# Patient Record
Sex: FEMALE | Race: BLACK OR AFRICAN AMERICAN | Employment: FULL TIME | URBAN - METROPOLITAN AREA
[De-identification: names, ages, dates, MRNs, and addresses within clinical notes are randomized per-mention and may not be internally consistent; named-entity substitution may affect disease eponyms.]

---

## 2017-02-07 ENCOUNTER — OFFICE VISIT (OUTPATIENT)
Dept: URGENT CARE | Facility: URGENT CARE | Age: 22
End: 2017-02-07
Payer: COMMERCIAL

## 2017-02-07 VITALS
SYSTOLIC BLOOD PRESSURE: 123 MMHG | DIASTOLIC BLOOD PRESSURE: 79 MMHG | WEIGHT: 153 LBS | HEART RATE: 74 BPM | TEMPERATURE: 98.2 F | BODY MASS INDEX: 25.49 KG/M2 | OXYGEN SATURATION: 99 % | HEIGHT: 65 IN

## 2017-02-07 DIAGNOSIS — J11.1 INFLUENZA-LIKE ILLNESS: Primary | ICD-10-CM

## 2017-02-07 PROCEDURE — 99202 OFFICE O/P NEW SF 15 MIN: CPT | Performed by: FAMILY MEDICINE

## 2017-02-07 RX ORDER — DESOGESTREL AND ETHINYL ESTRADIOL 0.15-0.03
1 KIT ORAL DAILY
COMMUNITY
End: 2019-12-17

## 2017-02-07 NOTE — Clinical Note
[facility]   [address], [place]  [address]  [phone]      February 7, 2017      To Whom It May Concern,    Nasra Galindo was seen tonight at [facility].  She was not able to attend school from February 6, 2017 thru February 8, 2017 secondary to a medical illness.  If better, she may attend school staring February 9, 2017.  Thanks.     Sincerely,      Margret Washington MD

## 2017-02-08 NOTE — PROGRESS NOTES
SUBJECTIVE:   Nasra Galindo is a 21 year old female who present complaining of flu-like symptoms: fevers, chills, myalgias, congestion, scratchy sore throat and cough for 4 days. Denies dyspnea or wheezing.  Needs an excuse note for missing school.   Did not have a flu shot last year.     OBJECTIVE:  Appears moderately ill but not toxic; temperature as noted in vitals. Ears normal. Throat and pharynx mildly injected, no exudates.  Neck supple. No adenopathy in the neck. Sinuses non tender. The chest is clear.    ASSESSMENT:  Influenza-like illness    PLAN:  Note for school given.   Symptomatic therapy suggested: rest, increase fluids, use mist of vaporizer prn, OTC acetaminophen, ibuprofen, decongestant of choice, cough suppressant of choice and call prn if symptoms persist or worsen. Call or return to clinic prn if these symptoms worsen or fail to improve as anticipated.  Margret Washington MD

## 2019-10-30 ENCOUNTER — OFFICE VISIT (OUTPATIENT)
Dept: FAMILY MEDICINE | Facility: CLINIC | Age: 24
End: 2019-10-30
Payer: COMMERCIAL

## 2019-10-30 VITALS
HEART RATE: 98 BPM | WEIGHT: 206 LBS | TEMPERATURE: 97.7 F | SYSTOLIC BLOOD PRESSURE: 127 MMHG | HEIGHT: 67 IN | BODY MASS INDEX: 32.33 KG/M2 | DIASTOLIC BLOOD PRESSURE: 88 MMHG

## 2019-10-30 DIAGNOSIS — N62 LARGE BREASTS: ICD-10-CM

## 2019-10-30 DIAGNOSIS — G89.29 CHRONIC BILATERAL THORACIC BACK PAIN: ICD-10-CM

## 2019-10-30 DIAGNOSIS — M54.42 CHRONIC MIDLINE LOW BACK PAIN WITH LEFT-SIDED SCIATICA: ICD-10-CM

## 2019-10-30 DIAGNOSIS — G89.29 CHRONIC MIDLINE LOW BACK PAIN WITH LEFT-SIDED SCIATICA: ICD-10-CM

## 2019-10-30 DIAGNOSIS — M54.6 CHRONIC BILATERAL THORACIC BACK PAIN: ICD-10-CM

## 2019-10-30 DIAGNOSIS — Z11.3 SCREEN FOR STD (SEXUALLY TRANSMITTED DISEASE): ICD-10-CM

## 2019-10-30 DIAGNOSIS — F41.8 DEPRESSION WITH ANXIETY: ICD-10-CM

## 2019-10-30 DIAGNOSIS — Z12.4 SCREENING FOR MALIGNANT NEOPLASM OF CERVIX: ICD-10-CM

## 2019-10-30 DIAGNOSIS — G89.29 CHRONIC PAIN OF LEFT KNEE: ICD-10-CM

## 2019-10-30 DIAGNOSIS — Z00.00 ROUTINE GENERAL MEDICAL EXAMINATION AT A HEALTH CARE FACILITY: Primary | ICD-10-CM

## 2019-10-30 DIAGNOSIS — M25.562 CHRONIC PAIN OF LEFT KNEE: ICD-10-CM

## 2019-10-30 PROCEDURE — G0145 SCR C/V CYTO,THINLAYER,RESCR: HCPCS | Performed by: FAMILY MEDICINE

## 2019-10-30 PROCEDURE — 99213 OFFICE O/P EST LOW 20 MIN: CPT | Mod: 25 | Performed by: FAMILY MEDICINE

## 2019-10-30 PROCEDURE — 87591 N.GONORRHOEAE DNA AMP PROB: CPT | Performed by: FAMILY MEDICINE

## 2019-10-30 PROCEDURE — 87491 CHLMYD TRACH DNA AMP PROBE: CPT | Performed by: FAMILY MEDICINE

## 2019-10-30 PROCEDURE — 99395 PREV VISIT EST AGE 18-39: CPT | Performed by: FAMILY MEDICINE

## 2019-10-30 ASSESSMENT — ENCOUNTER SYMPTOMS
CONSTIPATION: 0
EYE PAIN: 0
DIZZINESS: 0
HEADACHES: 0
PARESTHESIAS: 0
NAUSEA: 0
PALPITATIONS: 0
SORE THROAT: 0
MYALGIAS: 0
NERVOUS/ANXIOUS: 1
ABDOMINAL PAIN: 0
HEMATOCHEZIA: 0
JOINT SWELLING: 0
DIARRHEA: 0
WEAKNESS: 0
SHORTNESS OF BREATH: 0
ARTHRALGIAS: 1
HEMATURIA: 0
FEVER: 0
BREAST MASS: 0
HEARTBURN: 0
COUGH: 0
CHILLS: 0
DYSURIA: 0
FREQUENCY: 0

## 2019-10-30 ASSESSMENT — MIFFLIN-ST. JEOR: SCORE: 1719.1
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
## 2019-10-30 NOTE — PATIENT INSTRUCTIONS
We discussed various contraception options today. I do think that if you are not sexually active, you don't necessarily need to take hormones. Let us know if you decide to get an IUD or get change your contraceptive and we can schedule a time for you to come in for this.     KNEE: Try to do exercises that don't put a lot of strain on your knees. Please follow-up with physical therapy, so they can show you some low impact exercises that can help your knee symptoms.These physical therapy sessions will also help strengthen you upper, lower, and mid back.     90% of weight gain is attributed to diet, I would like you to consider implementing a healthier diet as this might help better manage weight gain. Try to see if you can get my fitness pale, as it is known to help with weight management.     Back pain: see physical therapy and remember that you can consult a plastic surgeon for free. Consult your insurance and see if you can get it cover it.      I would like you to send us a copy of your last cholesterol for our documentation purposes.     I would recommend that you follow-up with a sleep study, they may better assess any sleep symptoms you may be dealing with. Please follow-up with them when you get the chance.     Follow-up in 3 months for knee and back pain    Patient Education         Preventive Health Recommendations  Female Ages 21 to 25     Yearly exam:     See your health care provider every year in order to  o Review health changes.   o Discuss preventive care.    o Review your medicines if your doctor has prescribed any.      You should be tested each year for STDs (sexually transmitted diseases).       Talk to your provider about how often you should have cholesterol testing.      Get a Pap test every three years. If you have an abnormal result, your doctor may have you test more often.      If you are at risk for diabetes, you should have a diabetes test (fasting glucose).     Shots:     Get a flu shot  each year.     Get a tetanus shot every 10 years.     Consider getting the shot (vaccine) that prevents cervical cancer (Gardasil).    Nutrition:     Eat at least 5 servings of fruits and vegetables each day.    Eat whole-grain bread, whole-wheat pasta and brown rice instead of white grains and rice.    Get adequate Calcium and Vitamin D.     Lifestyle    Exercise at least 150 minutes a week each week (30 minutes a day, 5 days a week). This will help you control your weight and prevent disease.    Limit alcohol to one drink per day.    No smoking.     Wear sunscreen to prevent skin cancer.    See your dentist every six months for an exam and cleaning.

## 2019-10-30 NOTE — LETTER
[address] [address] [phone]                                                                                                November 4, 2019    Ratna Mckee  [address]  [facility] [address]        Dear Ms. Mckee,    Your recent lab results were within normal limits. A copy of those results are included with this letter.        Sincerely,      Bennie Calabrese, DO/hl    Results for orders placed or performed in visit on 10/30/19   Pap imaged thin layer screen only - recommended age 21 - 24 years     Status: None   Result Value Ref Range    PAP NIL     Copath Report         Patient Name: RATNA MCKEE  MR#: [id number]  Specimen #: [id number]  Collected: 10/30/2019  Received: 10/31/2019  Reported: 11/4/2019 09:01  Ordering Phy(s): BENNIE CALABRESE    For improved result formatting, select 'View Enhanced Report Format' under   Linked Documents section.    SPECIMEN/STAIN PROCESS:  Pap imaged thin layer prep screening (Surepath, FocalPoint with guided   screening)       Pap-Cyto x 1    SOURCE: Cervical, endocervical  ----------------------------------------------------------------   Pap imaged thin layer prep screening (Surepath, FocalPoint with guided   screening)  SPECIMEN ADEQUACY:  Satisfactory for evaluation.  -Transformation zone component absent.    CYTOLOGIC INTERPRETATION:    Negative for intraepithelial lesion or malignancy    Electronically signed out by:  DARELL Andre (ASCP)    CLINICAL HISTORY:    Papanicolaou Test Limitations:  Cervical cytology is a screening test with   limited sensitivity; regular  screening is critical for can cer prevention; Pap tests are primarily   effective for the diagnosis/prevention of  squamous cell carcinoma, not adenocarcinomas or other cancers.    COLLECTION SITE:  Client:  [facility]  Location: [facility])    The technical component of this testing was  completed at the [facility], with the professional component performed   at the [facility], [address] [place] [address],   [place] [address] ([phone])       Chlamydia trachomatis PCR     Status: None   Result Value Ref Range    Specimen Description Cervix     Chlamydia Trachomatis PCR Negative NEG^Negative   Neisseria gonorrhoeae PCR     Status: None   Result Value Ref Range    Specimen Descrip Cervix     N Gonorrhea PCR Negative NEG^Negative

## 2019-10-31 LAB
C TRACH DNA SPEC QL NAA+PROBE: NEGATIVE
N GONORRHOEA DNA SPEC QL NAA+PROBE: NEGATIVE
SPECIMEN SOURCE: NORMAL
SPECIMEN SOURCE: NORMAL

## 2019-11-04 LAB
COPATH REPORT: NORMAL
PAP: NORMAL

## 2019-11-06 ENCOUNTER — THERAPY VISIT (OUTPATIENT)
Dept: PHYSICAL THERAPY | Facility: CLINIC | Age: 24
End: 2019-11-06
Payer: COMMERCIAL

## 2019-11-06 DIAGNOSIS — N62 LARGE BREASTS: ICD-10-CM

## 2019-11-06 DIAGNOSIS — G89.29 CHRONIC MIDLINE LOW BACK PAIN WITH LEFT-SIDED SCIATICA: ICD-10-CM

## 2019-11-06 DIAGNOSIS — G89.29 CHRONIC PAIN OF BOTH KNEES: ICD-10-CM

## 2019-11-06 DIAGNOSIS — M54.6 CHRONIC BILATERAL THORACIC BACK PAIN: ICD-10-CM

## 2019-11-06 DIAGNOSIS — G89.29 CHRONIC BILATERAL THORACIC BACK PAIN: ICD-10-CM

## 2019-11-06 DIAGNOSIS — G89.29 CHRONIC PAIN OF LEFT KNEE: ICD-10-CM

## 2019-11-06 DIAGNOSIS — M25.562 CHRONIC PAIN OF BOTH KNEES: ICD-10-CM

## 2019-11-06 DIAGNOSIS — M25.561 CHRONIC PAIN OF BOTH KNEES: ICD-10-CM

## 2019-11-06 DIAGNOSIS — M54.42 CHRONIC MIDLINE LOW BACK PAIN WITH LEFT-SIDED SCIATICA: ICD-10-CM

## 2019-11-06 DIAGNOSIS — M25.562 CHRONIC PAIN OF LEFT KNEE: ICD-10-CM

## 2019-11-06 PROCEDURE — 97161 PT EVAL LOW COMPLEX 20 MIN: CPT | Mod: GP | Performed by: PHYSICAL THERAPIST

## 2019-11-06 PROCEDURE — 97110 THERAPEUTIC EXERCISES: CPT | Mod: GP | Performed by: PHYSICAL THERAPIST

## 2019-11-06 PROCEDURE — 97112 NEUROMUSCULAR REEDUCATION: CPT | Mod: GP | Performed by: PHYSICAL THERAPIST

## 2019-11-06 ASSESSMENT — ACTIVITIES OF DAILY LIVING (ADL)
RISE FROM A CHAIR: ACTIVITY IS MINIMALLY DIFFICULT
SIT WITH YOUR KNEE BENT: ACTIVITY IS SOMEWHAT DIFFICULT
HOW_WOULD_YOU_RATE_THE_CURRENT_FUNCTION_OF_YOUR_KNEE_DURING_YOUR_USUAL_DAILY_ACTIVITIES_ON_A_SCALE_FROM_0_TO_100_WITH_100_BEING_YOUR_LEVEL_OF_KNEE_FUNCTION_PRIOR_TO_YOUR_INJURY_AND_0_BEING_THE_INABILITY_TO_PERFORM_ANY_OF_YOUR_USUAL_DAILY_ACTIVITIES?: 60
HOW_WOULD_YOU_RATE_THE_OVERALL_FUNCTION_OF_YOUR_KNEE_DURING_YOUR_USUAL_DAILY_ACTIVITIES?: ABNORMAL
GIVING WAY, BUCKLING OR SHIFTING OF KNEE: THE SYMPTOM AFFECTS MY ACTIVITY SLIGHTLY
AS_A_RESULT_OF_YOUR_KNEE_INJURY,_HOW_WOULD_YOU_RATE_YOUR_CURRENT_LEVEL_OF_DAILY_ACTIVITY?: ABNORMAL
WALK: ACTIVITY IS MINIMALLY DIFFICULT
RAW_SCORE: 46
SQUAT: ACTIVITY IS FAIRLY DIFFICULT
SWELLING: I DO NOT HAVE THE SYMPTOM
GO UP STAIRS: ACTIVITY IS MINIMALLY DIFFICULT
LIMPING: THE SYMPTOM AFFECTS MY ACTIVITY SLIGHTLY
STAND: ACTIVITY IS SOMEWHAT DIFFICULT
KNEE_ACTIVITY_OF_DAILY_LIVING_SCORE: 65.71
WEAKNESS: I HAVE THE SYMPTOM BUT IT DOES NOT AFFECT MY ACTIVITY
PAIN: I HAVE THE SYMPTOM BUT IT DOES NOT AFFECT MY ACTIVITY
STIFFNESS: THE SYMPTOM AFFECTS MY ACTIVITY MODERATELY
KNEE_ACTIVITY_OF_DAILY_LIVING_SUM: 46
GO DOWN STAIRS: ACTIVITY IS MINIMALLY DIFFICULT
KNEEL ON THE FRONT OF YOUR KNEE: ACTIVITY IS VERY DIFFICULT
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
## 2019-11-12 ENCOUNTER — THERAPY VISIT (OUTPATIENT)
Dept: PHYSICAL THERAPY | Facility: CLINIC | Age: 24
End: 2019-11-12
Payer: COMMERCIAL

## 2019-11-12 DIAGNOSIS — G89.29 CHRONIC PAIN OF BOTH KNEES: ICD-10-CM

## 2019-11-12 DIAGNOSIS — M25.562 CHRONIC PAIN OF BOTH KNEES: ICD-10-CM

## 2019-11-12 DIAGNOSIS — M25.561 CHRONIC PAIN OF BOTH KNEES: ICD-10-CM

## 2019-11-12 PROCEDURE — 97110 THERAPEUTIC EXERCISES: CPT | Mod: GP | Performed by: PHYSICAL THERAPIST

## 2019-11-12 PROCEDURE — 97112 NEUROMUSCULAR REEDUCATION: CPT | Mod: GP | Performed by: PHYSICAL THERAPIST

## 2019-11-12 PROCEDURE — 97530 THERAPEUTIC ACTIVITIES: CPT | Mod: GP | Performed by: PHYSICAL THERAPIST

## 2019-11-20 ENCOUNTER — THERAPY VISIT (OUTPATIENT)
Dept: PHYSICAL THERAPY | Facility: CLINIC | Age: 24
End: 2019-11-20
Payer: COMMERCIAL

## 2019-11-20 DIAGNOSIS — M25.561 CHRONIC PAIN OF BOTH KNEES: ICD-10-CM

## 2019-11-20 DIAGNOSIS — G89.29 CHRONIC PAIN OF BOTH KNEES: ICD-10-CM

## 2019-11-20 DIAGNOSIS — M25.562 CHRONIC PAIN OF BOTH KNEES: ICD-10-CM

## 2019-11-20 PROCEDURE — 97112 NEUROMUSCULAR REEDUCATION: CPT | Mod: GP | Performed by: PHYSICAL THERAPIST

## 2019-11-20 PROCEDURE — 97110 THERAPEUTIC EXERCISES: CPT | Mod: GP | Performed by: PHYSICAL THERAPIST

## 2019-11-27 ENCOUNTER — THERAPY VISIT (OUTPATIENT)
Dept: PHYSICAL THERAPY | Facility: CLINIC | Age: 24
End: 2019-11-27
Payer: COMMERCIAL

## 2019-11-27 DIAGNOSIS — M25.562 CHRONIC PAIN OF BOTH KNEES: ICD-10-CM

## 2019-11-27 DIAGNOSIS — G89.29 CHRONIC PAIN OF BOTH KNEES: ICD-10-CM

## 2019-11-27 DIAGNOSIS — M25.561 CHRONIC PAIN OF BOTH KNEES: ICD-10-CM

## 2019-11-27 PROCEDURE — 97530 THERAPEUTIC ACTIVITIES: CPT | Mod: GP | Performed by: PHYSICAL THERAPIST

## 2019-11-27 PROCEDURE — 97140 MANUAL THERAPY 1/> REGIONS: CPT | Mod: GP | Performed by: PHYSICAL THERAPIST

## 2019-11-27 PROCEDURE — 97110 THERAPEUTIC EXERCISES: CPT | Mod: GP | Performed by: PHYSICAL THERAPIST

## 2019-12-04 ENCOUNTER — THERAPY VISIT (OUTPATIENT)
Dept: PHYSICAL THERAPY | Facility: CLINIC | Age: 24
End: 2019-12-04
Payer: COMMERCIAL

## 2019-12-04 DIAGNOSIS — G89.29 CHRONIC PAIN OF BOTH KNEES: ICD-10-CM

## 2019-12-04 DIAGNOSIS — M25.561 CHRONIC PAIN OF BOTH KNEES: ICD-10-CM

## 2019-12-04 DIAGNOSIS — M25.562 CHRONIC PAIN OF BOTH KNEES: ICD-10-CM

## 2019-12-04 PROCEDURE — 97110 THERAPEUTIC EXERCISES: CPT | Mod: GP | Performed by: PHYSICAL THERAPIST

## 2019-12-09 ENCOUNTER — PRE VISIT (OUTPATIENT)
Dept: SLEEP MEDICINE | Facility: CLINIC | Age: 24
End: 2019-12-09

## 2019-12-09 NOTE — TELEPHONE ENCOUNTER
"  1.  Reason for the visit:  Discuss snoring and possible sleep apnea  2.  Referring provider and clinic name:  Dr. Ramesh  3.  Previous Sleep Doctor or Pulmonlogist (clinic name)?  None noted  4.  Records, Procedures, Imaging, and Labs (see below)  No records to obtain        All NOTES from previous office visits that pertain to why they are being seen in the Sleep Center    Previous Sleep Studies, Chest CT, Echos and reports that pertain to why they are seeing Sleep Center    All Sleep records that have been done in the last 2 years that pertain to why they are seeing Sleep Center            Are they being seen for continuation of care for Cpap/Bipap/Avap/Trilogy/Dental Device? none    If yes to above Who and Where was Device issued/currently getting supplies from? na    Are you currently on \"Supplemental Oxygen\" during the day or night?   na                                                                                                                                                      Please remind pt to bring Cpap machine and ask to arrive 15 minutes early to appointment due traffic and congestion                                                 5. Pt Sleep Center Packet received Message left asking pt to arrive 30 minutes early to appointment if no packet received.        Yes: \"please make sure that you bring this to your appointment completed, either the doctor will not see you until this completed or you may be asked to reschedule your appointment.\"     No: mail or email to the pt and explain, \"please make sure that you bring this to your appointment completed, either the doctor will not see you until this completed or you may be asked to reschedule your appointment.\"     ~If pt coming early to fill packet out, ask that they come 30 minutes prior to their appointment~     6. Has the pt's medication list been updated and preferred pharmacy added?     7. Has the allergy list been reviewed?    \"Thank you for " "choosing [facility] and we look forward to seeing you at your upcoming appointment\"     "

## 2019-12-17 ENCOUNTER — OFFICE VISIT (OUTPATIENT)
Dept: SLEEP MEDICINE | Facility: CLINIC | Age: 24
End: 2019-12-17
Payer: COMMERCIAL

## 2019-12-17 VITALS
BODY MASS INDEX: 32.96 KG/M2 | OXYGEN SATURATION: 97 % | HEIGHT: 67 IN | HEART RATE: 104 BPM | WEIGHT: 210 LBS | RESPIRATION RATE: 18 BRPM | DIASTOLIC BLOOD PRESSURE: 82 MMHG | SYSTOLIC BLOOD PRESSURE: 127 MMHG

## 2019-12-17 DIAGNOSIS — G47.10 EXCESSIVE SLEEPINESS: ICD-10-CM

## 2019-12-17 DIAGNOSIS — F99 MENTAL HEALTH DISORDER: Primary | ICD-10-CM

## 2019-12-17 PROCEDURE — 99205 OFFICE O/P NEW HI 60 MIN: CPT | Performed by: INTERNAL MEDICINE

## 2019-12-17 RX ORDER — HYDROCORTISONE VALERATE CREAM 2 MG/G
CREAM TOPICAL
Refills: 1 | COMMUNITY
Start: 2019-07-02

## 2019-12-17 ASSESSMENT — MIFFLIN-ST. JEOR: SCORE: 1732.24

## 2019-12-17 NOTE — PATIENT INSTRUCTIONS
Your BMI is Body mass index is 33.39 kg/m .  Weight management is a personal decision.  If you are interested in exploring weight loss strategies, the following discussion covers the approaches that may be successful. Body mass index (BMI) is one way to tell whether you are at a healthy weight, overweight, or obese. It measures your weight in relation to your height.  A BMI of 18.5 to 24.9 is in the healthy range. A person with a BMI of 25 to 29.9 is considered overweight, and someone with a BMI of 30 or greater is considered obese. More than two-thirds of American adults are considered overweight or obese.  Being overweight or obese increases the risk for further weight gain. Excess weight may lead to heart disease and diabetes.  Creating and following plans for healthy eating and physical activity may help you improve your health.  Weight control is part of healthy lifestyle and includes exercise, emotional health, and healthy eating habits. Careful eating habits lifelong are the mainstay of weight control. Though there are significant health benefits from weight loss, long-term weight loss with diet alone may be very difficult to achieve- studies show long-term success with dietary management in less than 10% of people. Attaining a healthy weight may be especially difficult to achieve in those with severe obesity. In some cases, medications, devices and surgical management might be considered.  What can you do?  If you are overweight or obese and are interested in methods for weight loss, you should discuss this with your provider.     Consider reducing daily calorie intake by 500 calories.     Keep a food journal.     Avoiding skipping meals, consider cutting portions instead.    Diet combined with exercise helps maintain muscle while optimizing fat loss. Strength training is particularly important for building and maintaining muscle mass. Exercise helps reduce stress, increase energy, and improves fitness.  Reason For Visit  Pediatric Neurology consultation for. Extremity stiffness, history of intrauterine drug exposure/LISA, motor delay, speech delay. ARIEL is accompanied by his grandparent who are legal guardians.      Referred By  Referred By:   Dr BABAR Bacon  [facility]  [address]  [place]    Referring provider is the PCP        Quality    no exposure to secondhand smoke from cigarettes.      History of Present Illness    Ariel is a pleasant 2.5 -year-old boy who was seen in clinic on 10/5/2018.    He does have a fairly complicated medical history. His paternal grandparents are his legal guardians due to a history of substance abuse in both the mother and the father.    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 After delivery he was admitted to the  intensive care unit for LISA, morphine, phenobarbital and clonidine were initiated, withdrawal symptoms improved and this was discontinued prior to discharge.    At 6 months of age: paternal grandparents are his legal guardians due to a history of substance abuse in both the mother and the father. Unable to sit without support, no head control, hypotonic and underweight. Evaluated by early intervention, PT, OT and DT initiated     He was evaluated by an outside neurologist who suggested continuation of therapy and monitoring for cognitive delay.    I did review with the family potential etiologies as well as diagnostic and therapeutic options going forward. Between 8 months of age -2018: Grandparents have noted approximately 70% improvement, decreased stiffness, improvement in truncal hypotonia, independent head control at 8 months, began to crawl, pull to stand, cruising furniture, take steps with assistance, follows one-step commands, walks with a walker,  Increasing exercise without diet control, however, may not burn enough calories to loose weight.       Start walking three days a week 10-20 minutes at a time    Work towards walking thirty minutes five days a week     Eventually, increase the speed of your walking for 1-2 minutes at time    In addition, we recommend that you review healthy lifestyles and methods for weight loss available through the National Institutes of Health patient information sites:  http://win.niddk.nih.gov/publications/index.htm    And look into health and wellness programs that may be available through your health insurance provider, employer, local community center, or krz[employer]ztof club.    Weight management plan: Patient was referred to their PCP to discuss a diet and exercise plan.       she breaking objects, no pincer grasp, able to bring a spoon to his mouth to feed but somewhat unsteady/messy, takes his clothes off, toe walking, intermittent stiffening of the extremities along with arching when he is upset or frustrated or excited (Does not occur when he is calm and quiet). Family is working on speech. As per HPI/chart    No seizures, repetitive rhythmical movements of the extremities during sleep, occasional tossing and turning but does go back to bed, social smile, interacts and engages in play of other children are present, no sensory issues, no temper tantrums, no regression, no neurocutaneous stigmata, no tremors. As per HPI/chart        Review of Systems  as per HPi/chart        Current Meds   · No Reported Medications Recorded    Past Medical History    Past medical history was reviewed.      1. Intrauterine drug exposure   2. Hypotonia  3. Stiffness of the extremities, toe walking  4. History of  abstinence previously placed on morphine, phenobarbital and clonidine.  5. Motor delay/speech delay as per HPI/chart     Birth History:. as above.      Surgical History  as above.      Family History  The Patient/Family denies a family history of PGF- delayed walking  No seizures, no early childhood deaths.  Family history with regard to maternal side is somewhat limited given that legal guardians are the paternal grandparents. And mother is now present at the visit.   family medical history was reviewed.      Social History  He lives with his grandparent(s). PGP's3 legal gaurdians.   Childcare is provided Family friend and grandparents.      Vitals  Vital Signs    Recorded: 2018 08:11AM   Height 79.5 cm   Weight 11.5 kg   BMI Calculated 18.2   BSA Calculated 0.48   BMI Percentile 90   2-20 Stature Percentile 1 %   2-20 Weight Percentile 8 %   Head Circumference 45 cm     Results/Data  None.      Developmental Milestones    As above/chart.            Physical Exam  Constitutional:  well developed, well nourished and in no acute distress . Smiling, interactive, follows one-step commands, irritable and arching and stiffening is noted which completely resolves once patient is calm and quiet.   Head and Face: atraumatic. anterior fontanelle was normal and posterior fontanelle was normal. no dysmorphic features.   Eyes: no discharge, normal conjunctiva, no eyelid swelling and no ptosis. the sclerae were normal, pupils equal and reactive to light, conjugate gaze and extraocular movements were intact.   ENT: normal appearing outer ear, normal appearing nose. nasal mucosa moist and pink no nasal discharge oral mucosa pink and moist.   Neck: normal appearing neck.   Chest: normal chest appearance.   Cardiovascular: normal rate and regular rhythm.   Musculoskeletal: Toe walking, able to plan to the heel, tameka and recovers an object off the floor, walks with assistance,. no clubbing or cyanosis of the fingernails. no joint swelling seen and no joint tenderness was elicited. normal ROM of all extremities.   Abdomen: soft, nontender, nondistended and no abdominal mass. no umbilical hernia was discovered.   Neurologic: cranial nerves grossly intact and no facial asymmetry. age appropriate DTRs . somewhat brisk but no clonus, Babinski's downward. no coordination deficits observed . Reaches for objects without ataxia. Hypotonic in the trunk, somewhat increased tone in the distal extremities, toe walking noted, no scissoring when held in ventral suspension. sensory exam grossly normal.   Skin: normal skin color and pigmentation, no bruises and no rash. no skin lesions. normal skin turgor.      Assessment   1. Stiffness of extremity (M25.60)   2. 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 Truncal hypotonia (P94.2)    Discussion/Summary  Currently, the condition is moderate in severity.   Patient Discussion:   I have explained to the family that it is impossible for me to predict whether the symptoms would  continue or worsen, and, if so, how significant or severe these would be.    I have counseled the family extensively regarding the nature of the patient's difficulties, course, prognosis, plan, recommendations and outcome. The family will keep me informed of the patient's progress.      Plan  Follow-ups   Follow-up visit in 3 months Follow Up  Follow-up  Status: Active  Requested for:  2019    Is a pleasant 2.5-year-old child born at approximately 37 weeks gestation with a history of in utero drug exposure,  abstinence syndrome status post methadone, phenobarbital and clonidine. Family states they have noticed 70% improvement with therapy but continue to note some truncal hypotonia, stiffness in the extremities, motor delay, speech delay.     I did review with the family potential etiologies as well as diagnostic and therapeutic options going forward. They feel comfortable with below recommendations per    Recommendation:  1. Continue with therapy program.  2. Given 70% improvement - they feel comfortable deferring imaging and eeg.  3. seizure precautions and safety discussed  4. If clinical changes are noted they will contact the clinic.  5. Additional diagnostic and therapeutic options pending the above. 3.  Monitor for seizures.  If noted     Return to clinic in 3 month(s).      Time  Consult Counseling:   Total face to face time spent with patient 70 minutes. Greater than 50% of the total time was spent counseling and/or coordinating care.      Signatures   Electronically signed by : BENJA CHAO M.D.; Oct  5 2018 11:28AM CST

## 2019-12-18 NOTE — PROGRESS NOTES
DICTATED THE SLEEP CLINIC VISIT NOTE   Izabella Quevedo MD   of Medicine,  Division of Pulmonary/Sleep Medicine  [facility].
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
## 2020-01-02 ENCOUNTER — THERAPY VISIT (OUTPATIENT)
Dept: PHYSICAL THERAPY | Facility: CLINIC | Age: 25
End: 2020-01-02
Payer: COMMERCIAL

## 2020-01-02 DIAGNOSIS — M25.562 CHRONIC PAIN OF BOTH KNEES: ICD-10-CM

## 2020-01-02 DIAGNOSIS — M25.561 CHRONIC PAIN OF BOTH KNEES: ICD-10-CM

## 2020-01-02 DIAGNOSIS — G89.29 CHRONIC PAIN OF BOTH KNEES: ICD-10-CM

## 2020-01-02 PROCEDURE — 97112 NEUROMUSCULAR REEDUCATION: CPT | Mod: GP | Performed by: PHYSICAL THERAPIST

## 2020-01-02 PROCEDURE — 97530 THERAPEUTIC ACTIVITIES: CPT | Mod: GP | Performed by: PHYSICAL THERAPIST

## 2020-01-09 ENCOUNTER — OFFICE VISIT (OUTPATIENT)
Dept: ORTHOPEDICS | Facility: CLINIC | Age: 25
End: 2020-01-09
Payer: COMMERCIAL

## 2020-01-09 VITALS — HEIGHT: 66 IN | BODY MASS INDEX: 33.75 KG/M2 | WEIGHT: 210 LBS

## 2020-01-09 DIAGNOSIS — M54.50 ACUTE LEFT-SIDED LOW BACK PAIN WITHOUT SCIATICA: Primary | ICD-10-CM

## 2020-01-09 RX ORDER — TIZANIDINE 2 MG/1
2-4 TABLET ORAL 3 TIMES DAILY
Qty: 30 TABLET | Refills: 0 | Status: SHIPPED | OUTPATIENT
Start: 2020-01-09 | End: 2020-11-02

## 2020-01-09 ASSESSMENT — MIFFLIN-ST. JEOR: SCORE: 1724.3

## 2020-01-09 NOTE — PROGRESS NOTES
"[facility]  Orthopedics  Dany Ortiz MD  2020     Name: Nasra Galindo  MRN: [id number]  Age: 24 year old  : 1995  Referring provider: Referred Self     Chief Complaint: Pain of the Lower Back     History of Present Illness:   Nasra Galindo is a 24 year old female, with a history of hip alignment issues, who presents today for evaluation of left lower back pain sustained yesterday when she was at yoga without any inciting injury. She typically endorses a low grade pain in her low back, but she recently endorsed sharp pain in her low back that caused her left leg to give out.  Pain radiates to the left gluteal area.  No tingling or numbness.  She is currently in physical therapy for low back and her knees. Pain is exacerbated with weightbearing, sitting, bending, and movement. Pain is alleviated with the butterfly pose in yoga.     Review of Systems:   A 10-point review of systems was obtained and is negative except for as noted in the HPI.     Medications:      hydrocortisone (WESTCORT) 0.2 % external cream, APPLY TO AFFECTED AREA TWICE DAILY FOR NO MORE THAN TWO WEEKS., Disp: , Rfl: 1    Allergies:  Patient has no known allergies.     Past Medical History:  Anxiety  Depressive disorder    Past Surgical History:  Biopsy    Social History:  She denies tobacco use and admits to alcohol use.     Family History:  Positive: Diabetes (father), Mental illness (father), Obesity (father, maternal grandmother), Hypertension (mother), Hyperlipidemia (mother), Depression (mother), Anxiety (mother), Substance Abuse (mother)    Physical Examination:  Height 1.676 m (5' 6\"), weight 95.3 kg (210 lb), not currently breastfeeding.  General: alert, pleasant, no distress. sitting comfortably in chair  Back/Spine: no tenderness to palpation of spinous processes, or paraspinous musculature of lumbar spine.  Pain worse with side bending and forward flexion.  Straight leg raise produces pain back on the left. No hamstring " tightness noted. no pain in back with KIARRA testing bilaterally  Neuro: SILT on bilateral lower extremities, can stand on toes and heel walk without difficulty, patellar and achilles reflexes 2+ and symmetric, babinski downgoing bilaterally     Assessment:   24 year old female with acute left-sided low back pain concerning for mechanical etiology and spasm.    Plan:   - NSAIDS for symptomatic relief.   - Consider taking Tizanidine at [facility] for muscle spasm relief.   - Continue physical therapy.   - Follow-up with me if pain is persistent or worsens.    Dany Ortiz MD      Scribe Disclosure:  Russ MURDOCK, am serving as a scribe to document services personally performed by Dany Ortiz MD at this visit, based upon the provider's statements to me. All documentation has been reviewed by the aforementioned provider prior to being entered into the official medical record.

## 2020-01-09 NOTE — PROGRESS NOTES
SPORTS & ORTHOPEDIC WALK-IN VISIT 1/9/2020    Primary Care Physician:      She is currently in PT for low back and knees. She has issues with hip alignment and has been working on glute strengthening and core exercises. Typically it is a low grade pain, she went to yoga and early on her left leg gave out on her. She started to have intense low back pain. The majority of her pain is on her low back left glute.     Reason for visit:     What part of your body is injured / painful?  left low back    What caused the injury /pain? Unsure    How long ago did your injury occur or pain begin? yesterday    What are your most bothersome symptoms? Pain and leg giving way    How would you characterize your symptom?  sharp    What makes your symptoms better? Other: Butterfly pose in yoga    What makes your symptoms worse? Standing, Walking, Sitting, Movement and Bending    Have you been previously seen for this problem? Yes, PT    Medical History:    Any recent changes to your medical history? No    Any new medication prescribed since last visit? No    Have you had surgery on this body part before? No    Social History:    Occupation: Desk work    Handedness: Right    Exercise: PT and yoga    Review of Systems:    Do you have fever, chills, weight loss? No    Do you have any vision problems? No    Do you have any chest pain or edema? No    Do you have any shortness of breath or wheezing?  No    Do you have stomach problems? No    Do you have any numbness or focal weakness? Yes, weakness knees    Do you have diabetes? No    Do you have problems with bleeding or clotting? No    Do you have an rashes or other skin lesions? No

## 2020-01-13 ENCOUNTER — OFFICE VISIT (OUTPATIENT)
Dept: SLEEP MEDICINE | Facility: CLINIC | Age: 25
End: 2020-01-13
Payer: COMMERCIAL

## 2020-01-13 DIAGNOSIS — G47.10 HYPERSOMNIA, UNSPECIFIED: Primary | ICD-10-CM

## 2020-01-17 ENCOUNTER — THERAPY VISIT (OUTPATIENT)
Dept: PHYSICAL THERAPY | Facility: CLINIC | Age: 25
End: 2020-01-17
Payer: COMMERCIAL

## 2020-01-17 DIAGNOSIS — M25.562 CHRONIC PAIN OF BOTH KNEES: ICD-10-CM

## 2020-01-17 DIAGNOSIS — G89.29 CHRONIC PAIN OF BOTH KNEES: ICD-10-CM

## 2020-01-17 DIAGNOSIS — M25.561 CHRONIC PAIN OF BOTH KNEES: ICD-10-CM

## 2020-01-17 PROCEDURE — 97110 THERAPEUTIC EXERCISES: CPT | Mod: GP | Performed by: PHYSICAL THERAPIST

## 2020-01-17 PROCEDURE — 97140 MANUAL THERAPY 1/> REGIONS: CPT | Mod: GP | Performed by: PHYSICAL THERAPIST

## 2020-01-26 ENCOUNTER — DOCUMENTATION ONLY (OUTPATIENT)
Dept: SLEEP MEDICINE | Facility: CLINIC | Age: 25
End: 2020-01-26

## 2020-01-26 ENCOUNTER — THERAPY VISIT (OUTPATIENT)
Dept: SLEEP MEDICINE | Facility: CLINIC | Age: 25
End: 2020-01-26
Payer: COMMERCIAL

## 2020-01-26 DIAGNOSIS — G47.10 EXCESSIVE SLEEPINESS: ICD-10-CM

## 2020-01-26 DIAGNOSIS — G47.19 EXCESSIVE DAYTIME SLEEPINESS: ICD-10-CM

## 2020-01-26 PROCEDURE — 95810 POLYSOM 6/> YRS 4/> PARAM: CPT | Mod: GC | Performed by: INTERNAL MEDICINE

## 2020-01-27 ENCOUNTER — THERAPY VISIT (OUTPATIENT)
Dept: SLEEP MEDICINE | Facility: CLINIC | Age: 25
End: 2020-01-27
Payer: COMMERCIAL

## 2020-01-27 ENCOUNTER — DOCUMENTATION ONLY (OUTPATIENT)
Dept: SLEEP MEDICINE | Facility: CLINIC | Age: 25
End: 2020-01-27
Payer: COMMERCIAL

## 2020-01-27 DIAGNOSIS — G47.19 EXCESSIVE DAYTIME SLEEPINESS: Primary | ICD-10-CM

## 2020-01-27 LAB
AMPHETAMINES UR QL SCN: NEGATIVE
BARBITURATES UR QL: NEGATIVE
BENZODIAZ UR QL: NEGATIVE
CANNABINOIDS UR QL SCN: NEGATIVE
COCAINE UR QL: NEGATIVE
ETHANOL UR QL SCN: NEGATIVE
OPIATES UR QL SCN: NEGATIVE
PCP UR QL SCN: NEGATIVE

## 2020-01-27 PROCEDURE — 80307 DRUG TEST PRSMV CHEM ANLYZR: CPT | Performed by: INTERNAL MEDICINE

## 2020-01-27 PROCEDURE — 80320 DRUG SCREEN QUANTALCOHOLS: CPT | Performed by: INTERNAL MEDICINE

## 2020-01-27 PROCEDURE — 95805 MULTIPLE SLEEP LATENCY TEST: CPT | Performed by: INTERNAL MEDICINE

## 2020-01-27 NOTE — PATIENT INSTRUCTIONS
[facility]    1. Your sleep study will be reviewed by a sleep physician within the next few days.     2. Please follow up in the sleep clinic as scheduled, or, make an appointment with your sleep provider to be seen within two weeks to discuss the results of the sleep study.    3. If you have any questions or problems with your treatment plan, please contact your sleep clinic provider at [phone] to further manage your condition.    4. Please review your attached medication list, and, at your follow-up appointment advise your sleep clinic provider about any changes.    5. Go to [email or web address] for more information about your sleep problems.    Deon Dixon, RPSGT  January 27, 2020

## 2020-01-28 NOTE — PROGRESS NOTES
PHYSICIAN INTERPRETATION  Home Sleep Schedule-Actigraphy      Patient:  Nasra Galindo  MRN:  [id number]   Ext. ID: [id number]   YOB: 1995  Study Date: 1/27/2020    [facility] Phys[facility]  [facility]    Dates of recording: from 1/14 to 1/26/2020  Quality: good      Sleep diary: correlates well (No sleep log for 2 days)     Sleep onset typically/or range at 11pm- 12:30 mostly, with few exceptions of even more delayed sleep onset as late as 2 am.      Sleep offset typically/or range at  6:45-9:15 am         Total sleep time estimated is 7.14 hrs with shortest sleep period time of 6.0 hrs and longest sleep period time of 10 hrs.    Sleep periods are consolidated.     Light exposure periods are appropriately timed to sleep schedule    Napping is not noted.     Interpretation: Sleep wake pattern is consistent with       Sleep phase delay with few nights of insufficient sleep       Interpreted by:  Sreekanth Stern MD, MD  Sleep Medicine Fellow       327.31 Circadian rhythm sleep disorder, delayed sleep phase type  327.33 Circadian rhythm sleep disorder, irregular sleep-wake type

## 2020-01-28 NOTE — PROCEDURES
PHYSICIAN INTERPRETATION  Home Sleep Schedule-Actigraphy      Patient:  Nasra Galindo  MRN:  [id number]   Ext. ID: [id number]   YOB: 1995  Study Date: 1/27/2020    [facility]    Dates of recording: from 1/14 to 1/26/2020  Quality: good      Sleep diary: correlates well (No sleep log for 2 days)     Sleep onset typically/or range at 11pm- 12:30 mostly, with few exceptions of even more delayed sleep onset as late as 2 am.      Sleep offset typically/or range at  6:45-9:15 am         Total sleep time estimated is 7.14 hrs with shortest sleep period time of 6.0 hrs and longest sleep period time of 10 hrs.    Sleep periods are consolidated.     Light exposure periods are appropriately timed to sleep schedule    Napping is not noted.     Interpretation: Sleep wake pattern is consistent with       Sleep phase delay with few nights of insufficient sleep       Interpreted by:  Sreekanth Stern MD, MD  Sleep Medicine Fellow       327.31 Circadian rhythm sleep disorder, delayed sleep phase type  327.33 Circadian rhythm sleep disorder, irregular sleep-wake type

## 2020-01-31 ENCOUNTER — THERAPY VISIT (OUTPATIENT)
Dept: PHYSICAL THERAPY | Facility: CLINIC | Age: 25
End: 2020-01-31
Payer: COMMERCIAL

## 2020-01-31 DIAGNOSIS — M25.561 CHRONIC PAIN OF BOTH KNEES: Primary | ICD-10-CM

## 2020-01-31 DIAGNOSIS — M25.562 CHRONIC PAIN OF BOTH KNEES: Primary | ICD-10-CM

## 2020-01-31 DIAGNOSIS — G89.29 CHRONIC PAIN OF BOTH KNEES: Primary | ICD-10-CM

## 2020-01-31 PROCEDURE — 97110 THERAPEUTIC EXERCISES: CPT | Mod: GP | Performed by: PHYSICAL THERAPIST

## 2020-01-31 PROCEDURE — 97530 THERAPEUTIC ACTIVITIES: CPT | Mod: GP | Performed by: PHYSICAL THERAPIST

## 2020-02-01 NOTE — PROGRESS NOTES
PROGRESS  REPORT    Progress reporting period is from 11/6/19 to 1/31/20.       SUBJECTIVE   Subjective: Pt doing a ton of yoga the past few weeks. She notes that her self MET has been really helpful. The patient is very pleased with her progress and reports that she feels much stronger. She has not switched back to her normal exercises of strength training yet, and she would ultimately like to return to running. She has stopped taping her knees and they have been feeling well. She has lost 7lbs since xmas.    Changes in function:  Yes (See Goal flowsheet attached for changes in current functional level)  Adverse reaction to treatment or activity: None    OBJECTIVE  Changes noted in objective findings:  Yes,   Objective:   observation: ASIS level patient supine, positive trendelenburg bilaterally, good transverse abdominis coordination without cueing.     ASSESSMENT/PLAN  Updated problem list and treatment plan: Diagnosis 1:  lumbo pelvic instability  Pain -  hot/cold therapy, US, electric stimulation, mechanical traction, manual therapy, STS, splint/taping/bracing/orthotics, self management, education, directional preference exercise and home program  Decreased ROM/flexibility - manual therapy, therapeutic exercise, therapeutic activity and home program  Decreased strength - therapeutic exercise, therapeutic activities and home program  Impaired muscle performance - neuro re-education and home program  STG/LTGs have been met or progress has been made towards goals:  Yes (See Goal flow sheet completed today.)  Assessment of Progress: The patient's condition is improving.  Self Management Plans:  Patient has been instructed in a home treatment program.  Patient  has been instructed in self management of symptoms.  I have re-evaluated this patient and find that the nature, scope, duration and intensity of the therapy is appropriate for the medical condition of the patient.  Nasra continues to require the following  intervention to meet STG and LTG's:  PT    Recommendations:  This patient would benefit from continued therapy.     Frequency:  2 X a month, once daily  Duration:  for 2 months        Please refer to the daily flowsheet for treatment today, total treatment time and time spent performing 1:1 timed codes.

## 2020-02-10 ENCOUNTER — OFFICE VISIT (OUTPATIENT)
Dept: SLEEP MEDICINE | Facility: CLINIC | Age: 25
End: 2020-02-10
Payer: COMMERCIAL

## 2020-02-10 VITALS
BODY MASS INDEX: 32.78 KG/M2 | HEIGHT: 66 IN | OXYGEN SATURATION: 96 % | WEIGHT: 204 LBS | RESPIRATION RATE: 16 BRPM | SYSTOLIC BLOOD PRESSURE: 113 MMHG | DIASTOLIC BLOOD PRESSURE: 77 MMHG | HEART RATE: 63 BPM

## 2020-02-10 DIAGNOSIS — F51.5 NIGHTMARE DISORDER: Primary | ICD-10-CM

## 2020-02-10 DIAGNOSIS — G47.20 CIRCADIAN RHYTHM SLEEP DISTURBANCE: ICD-10-CM

## 2020-02-10 LAB — SLPCOMP: NORMAL

## 2020-02-10 PROCEDURE — 99214 OFFICE O/P EST MOD 30 MIN: CPT | Performed by: INTERNAL MEDICINE

## 2020-02-10 ASSESSMENT — MIFFLIN-ST. JEOR: SCORE: 1697.09

## 2020-02-10 NOTE — PATIENT INSTRUCTIONS
Your BMI is Body mass index is 32.93 kg/m .  Weight management is a personal decision.  If you are interested in exploring weight loss strategies, the following discussion covers the approaches that may be successful. Body mass index (BMI) is one way to tell whether you are at a healthy weight, overweight, or obese. It measures your weight in relation to your height.  A BMI of 18.5 to 24.9 is in the healthy range. A person with a BMI of 25 to 29.9 is considered overweight, and someone with a BMI of 30 or greater is considered obese. More than two-thirds of American adults are considered overweight or obese.  Being overweight or obese increases the risk for further weight gain. Excess weight may lead to heart disease and diabetes.  Creating and following plans for healthy eating and physical activity may help you improve your health.  Weight control is part of healthy lifestyle and includes exercise, emotional health, and healthy eating habits. Careful eating habits lifelong are the mainstay of weight control. Though there are significant health benefits from weight loss, long-term weight loss with diet alone may be very difficult to achieve- studies show long-term success with dietary management in less than 10% of people. Attaining a healthy weight may be especially difficult to achieve in those with severe obesity. In some cases, medications, devices and surgical management might be considered.  What can you do?  If you are overweight or obese and are interested in methods for weight loss, you should discuss this with your provider.     Consider reducing daily calorie intake by 500 calories.     Keep a food journal.     Avoiding skipping meals, consider cutting portions instead.    Diet combined with exercise helps maintain muscle while optimizing fat loss. Strength training is particularly important for building and maintaining muscle mass. Exercise helps reduce stress, increase energy, and improves fitness.  Increasing exercise without diet control, however, may not burn enough calories to loose weight.       Start walking three days a week 10-20 minutes at a time    Work towards walking thirty minutes five days a week     Eventually, increase the speed of your walking for 1-2 minutes at time    In addition, we recommend that you review healthy lifestyles and methods for weight loss available through the National Institutes of Health patient information sites:  http://win.niddk.nih.gov/publications/index.htm    And look into health and wellness programs that may be available through your health insurance provider, employer, local community center, or [employer].    Weight management plan: Patient was referred to their PCP to discuss a diet and exercise plan.

## 2020-02-10 NOTE — PROGRESS NOTES
[facility]  Outpatient Sleep Medicine Consultation  February 10, 2020      Name: Nasra Galindo MRN# [id number]   Age: 24 year old YOB: 1995     Date of Consultation: February 10, 2020  Consultation is requested by: No referring provider defined for this encounter.  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 twice weekly and symptoms of insecurity in the house since the age of 5 while sleeping alone at home      Summary Recommendations:      Extend sleep time to 11:30 PM bedtime to awakening time at 8 AM using her flexibility of work schedule    Image rehearsal therapy at least 15 to 20 minutes 1 hour before bedtime using 1 or 2 scripted positive dreamlike images    Keep sleep diary over the next week of adherence to the above and to frequency of nightmares     Summary Counseling:  -We discussed structured image rehearsal therapy using 's recommendations and had her recite the plan back to make sure she understood it.  -We also discussed extending sleep time and regularization of sleep schedule to prevent partial arousals contributing to her sleep talking and occasional limb movements at night that may reflect chronic insufficient sleep.  -We discussed the importance of sleep for optimization of cognition and emotional regulation and the mechanisms of recurrent nightmare disorder.           History of Present Illness:     Nasra Galindo is a 24 year old female with an [place] Sleepiness Scale of 16 without features of narcolepsy nor evidence of  "excessive sleep disruption or sleep apnea on polysomnography and normal multiple sleep latency testing.  Review of actigraphy does show a tendency for delayed sleep phase around 1130 to midnight with prolonged sleep periods on Friday nights and Saturday nights up to 10 hours and shorter periods of 6 hours on weekdays due to early work starts.    This patient also has had dramatic nightmares since her childhood of family members being killed or attacked without a specific perpetrator nor a prior specific history of childhood abuse.  She does acknowledge being frightened sleeping alone since the age of 5 and still is uncomfortable with a door of her bedroom open at night.           Medications:     Current Outpatient Medications   Medication Sig     hydrocortisone (WESTCORT) 0.2 % external cream APPLY TO AFFECTED AREA TWICE DAILY FOR NO MORE THAN TWO WEEKS.     tiZANidine (ZANAFLEX) 2 MG tablet Take 1-2 tablets (2-4 mg) by mouth 3 times daily     No current facility-administered medications for this visit.         No Known Allergies         Past Medical History:     Does not need 02 supplement at night   Past Medical History:   Diagnosis Date     Anxiety      Depressive disorder Diagnosed may 2019    Has been recurrent since 2010             Past Surgical History:    No h/o  upper airway surgery  Past Surgical History:   Procedure Laterality Date     BIOPSY  July 2016                      Physical Examination:   /77   Pulse 63   Resp 16   Ht 1.676 m (5' 6\")   Wt 92.5 kg (204 lb)   SpO2 96%   BMI 32.93 kg/m               Copy to: Clinic, [facility] Ta LAZO MD 2/10/2020     [facility]   Floor 1, [address]   [address] [place] [address]   Appointments: [phone]    [facility]  3rd Floor  [address], [place] [address]     Total time spent with patient: 30 min >50% counseling    "

## 2020-02-10 NOTE — PROCEDURES
" SLEEP STUDY INTERPRETATION  DIAGNOSTIC POLYSOMNOGRAPHY REPORT        Patient: RATNA MCKEE  YOB: 1995  Study Date: 1/26/2020  MRN: [id number]  Referring Provider: Nai Quevedo MD  Ordering Provider: Nai Quevedo MD     Indications for Polysomnography: The patient is a 24 y old Female who is 5' 6\" and weighs 210.0 lbs. Her BMI is 33.7, [place] sleepiness scale 16.0 and neck circumference is 37.0 cm. Relevant medical history includes depression, nightmares. A diagnostic polysomnogram was performed to evaluate for sleep apnea/PLMS/RLS contributing to hypersomnia.     Polysomnogram Data: A full night polysomnogram recorded the standard physiologic parameters including EEG, EOG, EMG, ECG, nasal and oral airflow. Respiratory parameters of chest and abdominal movements were recorded with respiratory inductance plethysmography. Oxygen saturation was recorded by pulse oximetry. Hypopnea scoring rule used: 1B 4%.     Sleep Architecture: Normal sleep latency and normal sleep efficiency. All stages of sleep were observed   The total recording time of the polysomnogram was 529.1 minutes. The total sleep time was 481.0 minutes. Sleep latency was normal at 24.6 minutes without the use of a sleep aid. REM latency was 131.0 minutes. Arousal index was normal at 21.7 arousals per hour. Sleep efficiency was normal at 90.9%. Wake after sleep onset was 21.5 minutes. The patient spent 3.0% of total sleep time in Stage N1, 64.2% in Stage N2, 12.0% in Stage N3, and 20.8% in REM. Time in REM supine was 100.0 minutes.     Respiration: mild snoring is reported , but there is no evidence of clinically significant obstructive sleep apnea with AHI 0.5 events/hr    Events ? The polysomnogram revealed a presence of - obstructive, 3 central, and - mixed apneas resulting in an apnea index of 0.4 events per hour. There were 1 obstructive hypopneas and - central hypopneas resulting in an obstructive hypopnea " index of 0.1 and central hypopnea index of - events per hour. The combined apnea/hypopnea index was 0.5 events per hour (central apnea/hypopnea index was 0.4 events per hour). The REM AHI was 2.4 events per hour. The supine AHI was 0.7 events per hour. The RERA index was 0.4 events per hour.  The RDI was 0.9 events per hour.    Snoring - was reported intermittent mild.    Respiratory rate and pattern - was notable for normal respiratory rate and pattern.    Sustained Sleep Associated Hypoventilation - Transcutaneous carbon dioxide monitoring was not used.     Sleep Associated Hypoxemia - (Greater than 5 minutes O2 sat at or below 88%) was not present. Baseline oxygen saturation was 94.9%. Lowest oxygen saturation was 88.2%. Time spent less than or equal to 88% was 0 minutes. Time spent less than or equal to 89% was 0 minutes.     Movement Activity: No abnormal behavior or movements noted.     Periodic Limb Activity - There were - PLMs during the entire study. The PLM index was - movements per hour. The PLM Arousal Index was - per hour.    REM EMG Activity - Excessive transient/sustained muscle activity was not present.    Nocturnal Behavior - Abnormal sleep related behaviors were not noted.      Bruxism - None apparent.     Cardiac Summary: Normal sinus rhythm.   The average pulse rate was 56.6 bpm. The minimum pulse rate was 35.8 bpm (which is likely artifactual) while the maximum pulse rate was 105.8 bpm.  Arrhythmias were not noted.       Pre PSG Evaluation:      Actigraphy with sleep logs:     Dates of recording: from 1/14 to 1/26/2020       Quality: good       Sleep diary: correlates well (No sleep log for 2 days)      Sleep onset typically/or range at 11pm- 12:30 mostly, with few exceptions of even more delayed sleep onset as late as 2 am.       Sleep offset typically/or range at 6:45-9:15 am          Total sleep time estimated is 7.14 hrs with shortest sleep period time of 6.0 hrs and longest sleep period  "time of 10 hrs.     Sleep periods appear consolidated.      Light exposure periods are appropriately timed to sleep schedule     Napping is not noted.      Interpretation: Sleep wake pattern is consistent with sleep phase delay with few nights of insufficient sleep.        Assessment:     Normal sleep latency and normal sleep efficiency. All stages of sleep were observed.     Mild snoring is reported but there is no evidence of clinically significant obstructive sleep apnea with AHI 0.5 events/hour.    No abnormal behavior or movements noted.    Normal sinus rhythm was noted.    Actigraphy with sleep logs suggests sleep phase delay with few nights of insufficient sleep.     Recommendations:    Suggest the option of dental appliance if snoring is of concern.    Advice regarding the risks of drowsy driving.    Suggest optimizing sleep schedule and avoiding sleep deprivation.    Weight management (if BMI > 30).     Diagnostic Codes:   Unspecified Sleep Disturbance G47.9     1/26/2020 [place] [facility] Study (210.0 lbs) - AHI 0.5, RDI 0.9, Supine AHI 0.7, REM AHI 2.4, Low O2 88.2%, Time Spent ?88% 0 minutes / Time Spent ?89% 0 minutes.     Sreekanth Stern MD FACP  Sleep Medicine Fellow      Attending MD: PSG was personally reviewed in detail with the Sleep Medicine Fellow.  The above interpretation reflects our joint assessment and recommendations.   _____________________________________   Electronically Signed By: (Nai Quevedo MD), 2/10/2020                      MSLT/MWT REPORT                      Patient Name: RATNA MCKEE Study Date: 1/27/2020   YOB: 1995 Study Type: MSLT   Age:  24 y MRN: [id number]   Sex: Female Interp Physician: -   BMI:  33.7 Ordering Physician: Nai Quevedo MD   Height: 5' 6\" Referring Physician: Nai Quevedo MD   Weight: 210.0 lbs Recording Tech: Malcolm LIVINGSTON RPSGT, RRT Deon DUNN RPSMICHELE   [place]: 16.0 Neck Size (cm): 37.0 Scoring Tech: " Malcolm LIVINGSTON, [facility], RRT Deon DUNN, [facility]   Nap Summary          Nap 1 Nap 2 Nap 3 Nap 4 Nap 5 Average   Lights Off 10:02:43 AM 12:14:44 PM 02:00:44 PM 04:03:15 PM - -   Lights On 10:29:58 AM 12:42:28 PM 02:28:28 PM 04:28:55 PM - -   Time In Bed 27.3 27.7 27.7 25.7 - 27.1   Sleep Time 10.8 5.8 14.3 15.2 - 11.5   Sleep Efficiency 39.5% 20.8% 51.4% 59.3% - 42.5%   Sleep Onset 10:14:42 AM 12:27:12 PM 02:13:12 PM 04:13:42 PM - -   Sleep Latency 12.0 12.5 12.5 10.4 - 11.9   REM Onset - - - - - -   REM Sleep Onset Latency - - - - - -           Recording / Sleep Tech Comments     Nap attempt 1:  On a sleepiness scale of 1-10 (10 being about to fall to sleep and 1 being wide awake) Nasra, rates herself 6.  She did achieve sleep but did not REM.  When asked she if she slept: yes.  When asked if she dreamt: No.  Nap attempt 2:  On a sleepiness scale of 1-10 (10 being about to fall to sleep and 1 being wide awake) Nasra, rates herself 7.  She did achieve sleep but did not REM.  When ask she if she slept: yes.  When asked if she dreamt: unsure.  Nap 2 was 15 minutes late due to lunch.  Nap attempt 3:  On a sleepiness scale of 1-10 (10 being about to fall to sleep and 1 being wide awake) Nasra, rates herself 5.  She did achieve sleep but did not REM.  When ask she if she slept: yes.  When asked if she dreamt: no.  Nap attempt 4:  On a sleepiness scale of 1-10 (10 being about to fall to sleep and 1 being wide awake) Nasra, rates herself 4.  She did achieve sleep but did not REM.  When ask she if she slept: Yes.  When asked if she dreamt: No.     Physician Interpretation  Actigraphy with sleep logs before the study showed very few nights of insufficient sleep.   Polysomnogram showed total sleep time of  481.0 minutes,  with no evidence of clinically significant TORI/ PLM s   Urine tox screen was negative      MSLT consisted of total 4 naps.    Average sleep latency of 11.9 min. There were no sleep onset REM  periods.     Assessment: There is no evidence of organic cause of hypersomnolence.      Recommendations:      Suggest follow regular sleep schedule, aim to obtain at least 8 hours of sleep / night.     Avoid driving if drowsy/ sleepy.      Sreekanth Stern MD, FACP  Sleep Medicine Fellow     Attending:  PSG was personally reviewed in detail with the Sleep Medicine Fellow.  The interpretation below reflects our joint assessment and recommendations.     _____________________________________   Electronically Signed By: (Nai Quevedo MD), 2/10/2020

## 2020-02-14 ENCOUNTER — THERAPY VISIT (OUTPATIENT)
Dept: PHYSICAL THERAPY | Facility: CLINIC | Age: 25
End: 2020-02-14
Payer: COMMERCIAL

## 2020-02-14 DIAGNOSIS — M25.561 CHRONIC PAIN OF BOTH KNEES: ICD-10-CM

## 2020-02-14 DIAGNOSIS — M25.562 CHRONIC PAIN OF BOTH KNEES: ICD-10-CM

## 2020-02-14 DIAGNOSIS — G89.29 CHRONIC PAIN OF BOTH KNEES: ICD-10-CM

## 2020-02-14 PROCEDURE — 97530 THERAPEUTIC ACTIVITIES: CPT | Mod: GP | Performed by: PHYSICAL THERAPIST

## 2020-02-14 PROCEDURE — 97112 NEUROMUSCULAR REEDUCATION: CPT | Mod: GP | Performed by: PHYSICAL THERAPIST

## 2020-02-14 PROCEDURE — 97110 THERAPEUTIC EXERCISES: CPT | Mod: GP | Performed by: PHYSICAL THERAPIST

## 2020-02-28 ENCOUNTER — THERAPY VISIT (OUTPATIENT)
Dept: PHYSICAL THERAPY | Facility: CLINIC | Age: 25
End: 2020-02-28
Payer: COMMERCIAL

## 2020-02-28 DIAGNOSIS — G89.29 CHRONIC PAIN OF BOTH KNEES: ICD-10-CM

## 2020-02-28 DIAGNOSIS — M25.561 CHRONIC PAIN OF BOTH KNEES: ICD-10-CM

## 2020-02-28 DIAGNOSIS — M25.562 CHRONIC PAIN OF BOTH KNEES: ICD-10-CM

## 2020-02-28 PROCEDURE — 97110 THERAPEUTIC EXERCISES: CPT | Mod: GP | Performed by: PHYSICAL THERAPIST

## 2020-02-28 PROCEDURE — 97112 NEUROMUSCULAR REEDUCATION: CPT | Mod: GP | Performed by: PHYSICAL THERAPIST

## 2020-02-28 PROCEDURE — 97530 THERAPEUTIC ACTIVITIES: CPT | Mod: GP | Performed by: PHYSICAL THERAPIST

## 2020-03-13 ENCOUNTER — THERAPY VISIT (OUTPATIENT)
Dept: PHYSICAL THERAPY | Facility: CLINIC | Age: 25
End: 2020-03-13
Payer: COMMERCIAL

## 2020-03-13 DIAGNOSIS — M25.562 CHRONIC PAIN OF BOTH KNEES: ICD-10-CM

## 2020-03-13 DIAGNOSIS — M25.561 CHRONIC PAIN OF BOTH KNEES: ICD-10-CM

## 2020-03-13 DIAGNOSIS — G89.29 CHRONIC PAIN OF BOTH KNEES: ICD-10-CM

## 2020-03-13 PROCEDURE — 97140 MANUAL THERAPY 1/> REGIONS: CPT | Mod: GP | Performed by: PHYSICAL THERAPIST

## 2020-03-13 PROCEDURE — 97530 THERAPEUTIC ACTIVITIES: CPT | Mod: GP | Performed by: PHYSICAL THERAPIST

## 2020-03-13 PROCEDURE — 97110 THERAPEUTIC EXERCISES: CPT | Mod: GP | Performed by: PHYSICAL THERAPIST

## 2020-03-16 ENCOUNTER — VIRTUAL VISIT (OUTPATIENT)
Dept: FAMILY MEDICINE | Facility: OTHER | Age: 25
End: 2020-03-16

## 2020-03-17 NOTE — PROGRESS NOTES
"Date: 2020 17:01:28  Clinician: Courtney Noble  Clinician NPI: [id number]  Patient: Nasra Galindo  Patient : 1995  Patient Address: [address] [place] [id number]  Patient Phone: [phone]  Visit Protocol: URI  Patient Summary:  Nasra is a 24 year old ( : 1995 ) female who initiated a Visit for cold, sinus infection, or influenza. When asked the question \"Please sign me up to receive news, health information and promotions from [employer].\", Nasra responded \"No\".    Nasra states her symptoms started suddenly 3-6 days ago.   Her symptoms consist of malaise, a headache, enlarged lymph nodes, myalgia, a sore throat, and chills.   Symptom details     Sore throat: Nasra reports having mild throat pain (1-3 on a 10 point pain scale), does not have exudate on her tonsils, and can swallow liquids. The lymph nodes in her neck are enlarged. A rash has not appeared on the skin since the sore throat started.     Headache: She states the headache is mild (1-3 on a 10 point pain scale).      Nasra denies having ear pain, rhinitis, facial pain or pressure, wheezing, cough, nasal congestion, teeth pain, and fever. She also denies having recent facial or sinus surgery in the past 60 days, double sickening (worsening symptoms after initial improvement), and taking antibiotic medication for the symptoms. She is not experiencing dyspnea.   Precipitating events  Within the past week, Nasra has not been exposed to someone with strep throat. She has recently been exposed to someone with influenza. Nasra has not been in close contact with any high risk individuals.   Pertinent COVID-19 (Coronavirus) information  Nasra has not traveled internationally or to the areas where COVID-19 (Coronavirus) is widespread in the last 14 days before the start of her symptoms.   Nasra has not had close contact with a suspected or laboratory-confirmed COVID-19 patient within 14 days of symptom onset.   " Nasra is not a healthcare worker and does not work in a healthcare facility.   Pertinent medical history  Nasra does not get yeast infections when she takes antibiotics.   Nasra does not need a return to work/school note.   Weight: 190 lbs   Nasra does not smoke or use smokeless tobacco.   She denies pregnancy and denies breastfeeding. She has menstruated in the past month.   Additional information as reported by the patient (free text): I just want to know if I should go in to get tested for either the Flu or Coronavirus. Went to bed Friday night with a sore throat (thought it was allergies). Woke up Saturday with a headache, sore throat, and body aches. By Saturday afternoon had chills and a fever and went to bed Saturday night feeling horrible. Spent all day Sunday in bed with all of those symptoms above. No fever today (Medicating w/ cold/flu syrup and ibuprofen), and haven't coughed at all since this started.   Weight: 190 lbs    MEDICATIONS: hydrocortisone valerate topical, tizanidine oral, ALLERGIES: NKDA  Clinician Response:  Dear Nasra,  Based on the information provided, you have viral pharyngitis. This is a sore throat caused by a virus and is usually the first sign of a cold. Your sore throat should resolve in a couple days as other cold symptoms develop.  Unfortunately, there are no medications that can cure a cold, so treatment is focused on controlling symptoms as much as possible until you recover. Most people gradually feel better in 1-2 weeks.  Medication information  Because you have a viral infection, antibiotics will not help you get better. Treating a viral infection with antibiotics could actually make you feel worse.  Self care  The following tips will keep you as comfortable as possible while you recover:     Rest    Drink plenty of water and other liquids    Use throat lozenges    Gargle with warm salt water (1/4 teaspoon of salt per 8 ounce glass of water)    Suck on frozen items  such as popsicles or ice cubes    Drink hot tea with lemon and honey     When to seek care  Please be seen in a clinic or urgent care if new symptoms develop, or symptoms become worse.  Call 911 or go to the emergency room if you feel that your throat is closing off, you suddenly develop a rash, you are unable to swallow fluids, you are drooling, or you are having difficulty breathing.  COVID-19 (Coronavirus) General Information  With the increase in the number of COVID-19 (Coronavirus) cases, we understand you may have some questions. Below is some helpful information on COVID-19 (Coronavirus).  How can I protect myself and others from the COVID-19 (Coronavirus)?  Because there is currently no vaccine to prevent infection, the best way to protect yourself is to avoid being exposed to this virus. Put distance between yourself and other people if COVID-19 (Coronavirus) is spreading in your community. The virus is thought to spread mainly from person-to-person.     Between people who are in close contact with one another (within about 6 about) for prolonged period (10 minutes or longer).    Through respiratory droplets produced when an infected person coughs or sneezes.     The CDC recommends the following additional steps to protect yourself and others:     Wash your hands often with soap and water for at least 20 seconds, especially after blowing your nose, coughing, or sneezing; going to the bathroom; and before eating or preparing food.  Use an alcohol-based hand  that contains at least 60 percent alcohol if soap and water are not available.        Avoid touching your eyes, nose and mouth with unwashed hands.    Avoid close contact with people who are sick.    Stay home when you are sick.    Cover your cough or sneeze with a tissue, then throw the tissue in the trash.    Clean and disinfect frequently touched objects and surfaces.     You can help stop COVID-19 (Coronavirus) by knowing the signs and  symptoms:     Fever    Cough    Shortness of breath     Contact your healthcare provider if   Develop symptoms   AND   Have been in close contact with a person known to have COVID-19 (Coronavirus) or live in or have recently traveled from an area with ongoing spread of COVID-19 (Coronavirus). Call ahead before you go to a doctor's office or emergency room. Tell them about your recent travel and your symptoms.   For the most up to date information, visit the CDC's website.  Steps to help prevent the spread of COVID-19 (Coronavirus) if you are sick  If you are sick with COVID-19 (Coronavirus) or suspect you are infected with the virus that causes COVID-19 (Coronavirus), follow the steps below to help prevent the disease from spreading&nbsp;to people in your home and community.     Stay home except to get medical care. Home isolation may be started in consultation with your healthcare clinician.    Separate yourself from other people and animals in your home.    Call ahead before visiting your doctor if you have a medical appointment.    Wear a facemask when you are around other people.    Cover your cough and sneezes.    Clean your hands often.    Avoid sharing personal household items.    Clean and disinfect frequently touched objects and surfaces everyday.    You will need to have someone drop off medications or household supplies (if needed) at your house without coming inside or in contact with you or others living in your house.    Monitor your symptoms and seek prompt medical care if your illness is worsening (e.g. Difficulty breathing).    Discontinue home isolation only in consultation with your healthcare provider.     For more detailed and up to date information on what to do if you are sick, visit this link: What to Do If You Are Sick With Coronavirus Disease 2019 (COVID-19).  Do I need to be tested for COVID-19 (Coronavirus)?     At this time, the limited number of tests available are controlled by the  "state and local health departments and are being reserved for more seriously ill patients, those with known exposure to confirmed patients, and those with recent travel (within 14 days) to countries with high rates of COVID-19 (Coronavirus).    Decisions on which patients receive testing will be based on the local spread of COVID-19 (Coronavirus) as well as the symptoms. Your healthcare provider will make the final decision on whether you should be tested.    In the meantime, if you have concerns that you may have been exposed, it is reasonable to practice \"social distancing.\"&nbsp; If you are ill with a cold or flu-like illness, please monitor your symptoms and reach out to your healthcare provider if your symptoms worsen.    For more up to date information, visit this link: COVID-19 (Coronavirus) Frequently Asked Questions and Answers.      Diagnosis: Viral pharyngitis  Diagnosis ICD: J02.9  Additional Clinician Notes: Yoni Hammonds.     I'm sorry to hear you're not feeling well.   Your symptoms are more suspicious for pharyngitis (sore throat) which could be viral which is most common or strep.  If your fever has resolved and your symptoms are improving there is no need for strep testing, however if your fever or sore throat persisted it would be reasonable to be seen for strep evaluation.     In regards to COVID it does not appear you have had exposure.  You do meet criteria if you had fever but typically patients who have had COVID have had a dry cough and usually not much for the other upper respiratory symptoms such as sore throat.  There is still a chance you could have COVID, there is now community spread.  However you are a low risk patient and do not need testing.  You could have had influenza, this is a possibility as well but you are outside the window for Tamiflu and likely to resolve completely by day 7.     If your symptoms were to change or you were to get worse we would recommend evaluation in clinic. "  In the meantime if you are sick please remain home and you must be fever free for 24 hours prior to returning to work or school.

## 2020-03-23 ENCOUNTER — TELEPHONE (OUTPATIENT)
Dept: FAMILY MEDICINE | Facility: CLINIC | Age: 25
End: 2020-03-23

## 2020-03-23 NOTE — TELEPHONE ENCOUNTER
Reason for Call:  Other - Flu Symptoms    Detailed comments: Patient called regarding flu like symptoms starting 3/13/20; fever, chills, body ache and headache.  No Cough or respiratory symptoms.  .  She has been isolated since 3/15/20.      How long does she have to stay isolated?  She needs to go back to work.    Please call to advise.     Phone Number Patient can be reached at: Home number on file [phone] (home)    Best Time: anytime    Can we leave a detailed message on this number? YES       Call taken on 3/23/2020 at 11:40 AM by Ifeoma Brooks

## 2020-03-23 NOTE — TELEPHONE ENCOUNTER
"RN spoke with patient. RN relayed [facility] note to patient \"Your symptoms are more suspicious for pharyngitis (sore throat) which could be viral which is most common or strep.  If your fever has resolved and your symptoms are improving there is no need for strep testing, however if your fever or sore throat persisted it would be reasonable to be seen for strep evaluation.      In regards to COVID it does not appear you have had exposure.  You do meet criteria if you had fever but typically patients who have had COVID have had a dry cough and usually not much for the other upper respiratory symptoms such as sore throat.  There is still a chance you could have COVID, there is now community spread.  However you are a low risk patient and do not need testing.  You could have had influenza, this is a possibility as well but you are outside the window for Tamiflu and likely to resolve completely by day 7.      If your symptoms were to change or you were to get worse we would recommend evaluation in clinic.  In the meantime if you are sick please remain home and you must be fever free for 24 hours prior to returning to work or school.\"     Patient verbalized understanding.     Felicita Hernandez RN, BSN, PHN  [facility]: [place]       "

## 2020-04-17 ENCOUNTER — TELEPHONE (OUTPATIENT)
Dept: PHYSICAL THERAPY | Facility: CLINIC | Age: 25
End: 2020-04-17

## 2020-07-01 ENCOUNTER — VIRTUAL VISIT (OUTPATIENT)
Dept: SLEEP MEDICINE | Facility: CLINIC | Age: 25
End: 2020-07-01
Payer: COMMERCIAL

## 2020-07-01 DIAGNOSIS — F51.5 NIGHTMARE DISORDER: Primary | ICD-10-CM

## 2020-07-01 PROBLEM — G89.29 CHRONIC PAIN OF BOTH KNEES: Status: RESOLVED | Noted: 2019-11-06 | Resolved: 2020-07-01

## 2020-07-01 PROBLEM — M25.562 CHRONIC PAIN OF BOTH KNEES: Status: RESOLVED | Noted: 2019-11-06 | Resolved: 2020-07-01

## 2020-07-01 PROBLEM — M25.561 CHRONIC PAIN OF BOTH KNEES: Status: RESOLVED | Noted: 2019-11-06 | Resolved: 2020-07-01

## 2020-07-01 PROCEDURE — 99214 OFFICE O/P EST MOD 30 MIN: CPT | Mod: 95 | Performed by: INTERNAL MEDICINE

## 2020-07-01 NOTE — PROGRESS NOTES
Discharge Note    Progress reporting period is from last progress note on   to Mar 13, 2020.    Nasra failed to follow up and current status is unknown.  Please see information below for last relevant information on current status.  Patient seen for 11 visits.    SUBJECTIVE  Subjective changes noted by patient:  The pt reports that he upper back has been feeling tight. Pt reports flexion stretches feel better.  .  Current pain level is  .     Previous pain level was  3/10.   Changes in function:  Yes (See Goal flowsheet attached for changes in current functional level)  Adverse reaction to treatment or activity: None    OBJECTIVE  Changes noted in objective findings: hypomobility: C7-T5. Palpation: ttp upper traps, levator and paraspinals.     ASSESSMENT/PLAN  Diagnosis: chronic B knee pain   Updated problem list and treatment plan:   Pain - HEP  Decreased ROM/flexibility - HEP  Decreased strength - HEP  Impaired muscle performance - HEP  STG/LTGs have been met or progress has been made towards goals:  Yes, please see goal flowsheet for most current information  Assessment of Progress: current status is unknown.    Last current status: Pt is progressing as expected   Self Management Plans:  HEP  I have re-evaluated this patient and find that the nature, scope, duration and intensity of the therapy is appropriate for the medical condition of the patient.  Nasra continues to require the following intervention to meet STG and LTG's:  HEP.    Recommendations:  Discharge with current home program.  Patient to follow up with MD as needed.    Please refer to the daily flowsheet for treatment today, total treatment time and time spent performing 1:1 timed codes.

## 2020-07-01 NOTE — PROGRESS NOTES
"Nasra Galindo is a 24 year old female who is being evaluated via a billable video visit.      The patient has been notified of following:     \"This video visit will be conducted via a call between you and your physician/provider. We have found that certain health care needs can be provided without the need for an in-person physical exam.  This service lets us provide the care you need with a video conversation.  If a prescription is necessary we can send it directly to your pharmacy.  If lab work is needed we can place an order for that and you can then stop by our lab to have the test done at a later time.    Video visits are billed at different rates depending on your insurance coverage.  Please reach out to your insurance provider with any questions.    If during the course of the call the physician/provider feels a video visit is not appropriate, you will not be charged for this service.\"    Patient has given verbal consent for Video visit? Yes  How would you like to obtain your AVS? MyChart  Patient would like the video invitation sent by: Text to cell phone: mobile  Will anyone else be joining your video visit? No        Video-Visit Details    Type of service:  Video Visit    Video Start Time: 2:43 PM  Video End Time: 2:43 PM    Originating Location (pt. Location): Home    Distant Location (provider location):  [facility]     Platform used for Video Visit: [facility]  Outpatient Sleep Medicine Consultation  July 1, 2020    Name: Nasra Galindo MRN# [id number]   Age: 24 year old YOB: 1995     Date of Consultation: July 1, 2020  Consultation is requested by: No referring provider defined for this encounter.  Primary care provider: [facility]  Nasra Galindo is a 24 year old female who is being evaluated via a billable telephone visit.               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 twice weekly and symptoms of insecurity in the house since the age of 5 while sleeping alone at home        Summary Recommendations:       Melatonin/valerian root for relaxation    Extend sleep time to 11:30 PM bedtime to awakening time at 8 AM using her flexibility of work schedule    Image rehearsal therapy at least 15 to 20 minutes 1 hour before bedtime using 1 or 2 scripted positive dreamlike images    Keep sleep diary over the next week of adherence to the above and to frequency of nightmares      Summary Counseling:  -Review 's recommendations and consider dream rehearsal   -We also reinforced regularization of sleep schedule with plan for follow-up of sleep diary at next visit  -We discussed the limitations of sleep promoting agents and their lack of durability and the long-term benefits of cognitive behavioral approaches.  In this context we did acknowledge that she might use intervention she felt comfortable with including melatonin and valerian root as well as lavender and yoga before bedtime         History of Present Illness:     Nasra Galindo is a 24 year old female with nightmare disorder and irregular sleep rhythm.  She has recently had increased stressors related to work events with increasing symptoms of insomnia sleep irregularity excessive thinking.  She has not yet addressed image rehearsal therapy for her nightmares and has experienced some increasing intensity.  She is requesting a sleep promoting agent saying that she is already taken a combined over-the-counter agent.         Medications:      Current Outpatient Medications   Medication Sig     hydrocortisone (WESTCORT) 0.2 % external cream APPLY TO AFFECTED AREA TWICE DAILY FOR NO MORE THAN TWO WEEKS.     tiZANidine (ZANAFLEX) 2 MG tablet Take 1-2 tablets (2-4 mg) by mouth 3 times daily     No current facility-administered medications for this visit.         No Known Allergies         Past Medical History:     Does not need 02 supplement at night   Past Medical History:   Diagnosis Date     Anxiety      Depressive disorder Diagnosed may 2019    Has been recurrent since 2010             Past Surgical History:    No h/o  upper airway surgery  Past Surgical History:   Procedure Laterality Date     BIOPSY  July 2016                      Physical Examination:     Objective   Reported vitals:  There were no vitals taken for this visit.   healthy, alert and no distress  Psych: Alert and oriented times 3; coherent speech, normal   rate and volume, able to articulate logical thoughts, able   to abstract reason, no tangential thoughts, no hallucinations   or delusions  His affect is normal         Copy to: Clinic, [facility]      DONNA LAZO MD 7/1/2020     [facility]   Floor 1, [address]   [address] [place] [address]   Appointments: [phone]    [facility]  3rd Floor  [address], [place] [address]     Total time spent with patient: 30 min >50% counseling

## 2020-07-21 ENCOUNTER — VIRTUAL VISIT (OUTPATIENT)
Dept: FAMILY MEDICINE | Facility: OTHER | Age: 25
End: 2020-07-21

## 2020-07-21 NOTE — PROGRESS NOTES
"Date: 2020 16:22:14  Clinician: Bandar Ramirez  Clinician NPI: [id number]  Patient: Nasra Galindo  Patient : 1995  Patient Address: [address] [place] [id number]  Patient Phone: [phone]  Visit Protocol: URI  Patient Summary:  Nasra is a 24 year old ( : 1995 ) female who initiated a Visit for COVID-19 (Coronavirus) evaluation and screening. When asked the question \"Please sign me up to receive news, health information and promotions from [employer].\", Nasra responded \"No\".    Nasra states her symptoms started gradually 3-4 days ago. After her symptoms started, they improved and then got worse again.   Her symptoms consist of malaise, chills, a sore throat, enlarged lymph nodes, and myalgia.   Symptom details   Sore throat: Nasra reports having mild throat pain (1-3 on a 10 point pain scale), does not have exudate on her tonsils, and can swallow liquids. The lymph nodes in her neck are enlarged. A rash has not appeared on the skin since the sore throat started.    Nasra denies having wheezing, nausea, teeth pain, ageusia, diarrhea, vomiting, rhinitis, ear pain, headache, anosmia, facial pain or pressure, fever, cough, and nasal congestion. She also denies having recent facial or sinus surgery in the past 60 days and taking antibiotic medication in the past month. She is not experiencing dyspnea.   Precipitating events  Nasra is not sure if she has been exposed to someone with strep throat. She has not recently been exposed to someone with influenza. Nasra has been in close contact with the following high risk individuals: people with asthma, heart disease or diabetes.   Pertinent COVID-19 (Coronavirus) information  In the past 14 days, Nasra has not worked in a congregate living setting.   She does not work or volunteer as healthcare worker or a  and does not work or volunteer in a healthcare facility.   Nasra also has not lived in a congregate living " setting in the past 14 days. She does not live with a healthcare worker.   Nasra has not had a close contact with a laboratory-confirmed COVID-19 patient within 14 days of symptom onset.   Pertinent medical history  Nasra does not get yeast infections when she takes antibiotics.   Nasra does not need a return to work/school note.   Weight: 185 lbs   Nasra does not smoke or use smokeless tobacco.   She denies pregnancy and denies breastfeeding. She is currently menstruating.   Additional information as reported by the patient (free text): My fatigue/tiredness has been very extreme in the last few days. I slept through 2 alarms today and for 12 hours. After waking up, I fell back asleep for another 2 hours.   Weight: 185 lbs    MEDICATIONS: hydrocortisone valerate topical, tizanidine oral, ALLERGIES: NKDA  Clinician Response:  Dear Nasra,   Your symptoms show that you may have coronavirus (COVID-19). This illness can cause fever, cough and trouble breathing. Many people get a mild case and get better on their own. Some people can get very sick.  What should I do?  We would like to test you for this virus.   1. Please call [phone] to schedule your visit. Explain that you were referred by [facility] to have a COVID-19 test. Be ready to share your [facility] visit ID number.  The following will serve as your written order for this COVID Test, ordered by me, for the indication of suspected COVID [Z20.828]: The test will be ordered in [facility], our electronic health record, after you are scheduled. It will show as ordered and authorized by Kartik Mortensen MD.  Order: COVID-19 (Coronavirus) PCR for SYMPTOMATIC testing from [facility].      2. When it's time for your COVID test:  Stay at least 6 feet away from others. (If someone will drive you to your test, stay in the backseat, as far away from the  as you can.)   Cover your mouth and nose with a mask, tissue or washcloth.  Go straight to the testing site. Don't make any  "stops on the way there or back.      3.Starting now: Stay home and away from others (self-isolate) until:   You've had no fever---and no medicine that reduces fever---for 3 full days (72 hours). And...   Your other symptoms have gotten better. For example, your cough or breathing has improved. And...   At least 10 days have passed since your symptoms started.       During this time, don't leave the house except for testing or medical care.   Stay in your own room, even for meals. Use your own bathroom if you can.   Stay away from others in your home. No hugging, kissing or shaking hands. No visitors.  Don't go to work, school or anywhere else.    Clean \"high touch\" surfaces often (doorknobs, counters, handles, etc.). Use a household cleaning spray or wipes. You'll find a full list of  on the EPA website: www.epa.gov/pesticide-registration/list-n-disinfectants-use-against-sars-cov-2.   Cover your mouth and nose with a mask, tissue or washcloth to avoid spreading germs.  Wash your hands and face often. Use soap and water.  Caregivers in these groups are at risk for severe illness due to COVID-19:  o People 65 years and older  o People who live in a nursing home or long-term care facility  o People with chronic disease (lung, heart, cancer, diabetes, kidney, liver, immunologic)  o People who have a weakened immune system, including those who:   Are in cancer treatment  Take medicine that weakens the immune system, such as corticosteroids  Had a bone marrow or organ transplant  Have an immune deficiency  Have poorly controlled HIV or AIDS  Are obese (body mass index of 40 or higher)  Smoke regularly   o Caregivers should wear gloves while washing dishes, handling laundry and cleaning bedrooms and bathrooms.  o Use caution when washing and drying laundry: Don't shake dirty laundry, and use the warmest water setting that you can.  o For more tips, go to www.cdc.gov/coronavirus/2019-ncov/downloads/10Things.pdf.    " 4.Sign up for [employer]. We know it's scary to hear that you might have COVID-19. We want to track your symptoms to make sure you're okay over the next 2 weeks. Please look for an email from [employer]---this is a free, online program that we'll use to keep in touch. To sign up, follow the link in the email. Learn more at [email or web address]  How can I take care of myself?   Get lots of rest. Drink extra fluids (unless a doctor has told you not to).   Take Tylenol (acetaminophen) for fever or pain. If you have liver or kidney problems, ask your family doctor if it's okay to take Tylenol.   Adults can take either:    650 mg (two 325 mg pills) every 4 to 6 hours, or...   1,000 mg (two 500 mg pills) every 8 hours as needed.    Note: Don't take more than 3,000 mg in one day. Acetaminophen is found in many medicines (both prescribed and over-the-counter medicines). Read all labels to be sure you don't take too much.   For children, check the Tylenol bottle for the right dose. The dose is based on the child's age or weight.    If you have other health problems (like cancer, heart failure, an organ transplant or severe kidney disease): Call your specialty clinic if you don't feel better in the next 2 days.       Know when to call 911. Emergency warning signs include:    Trouble breathing or shortness of breath Pain or pressure in the chest that doesn't go away Feeling confused like you haven't felt before, or not being able to wake up Bluish-colored lips or face.  Where can I get more information?    [employer] -- About COVID-19: [email or web address]   CDC -- What to Do If You're Sick: www.cdc.gov/coronavirus/2019-ncov/about/steps-when-sick.html   CDC -- Ending Home Isolation: www.cdc.gov/coronavirus/2019-ncov/hcp/disposition-in-home-patients.html   CDC -- Caring for Someone: www.cdc.gov/coronavirus/2019-ncov/if-you-are-sick/care-for-someone.html   [facility] -- Interim Guidance for Hospital  Discharge to Home: www.health.[facility].[place].us/diseases/coronavirus/hcp/hospdischarge.pdf   [facility] clinical trials (COVID-19 research studies): clinicalaffairs.[facility]/n-clinical-trials    Below are the COVID-19 hotlines at the [place] Department of Health ([facility]). Interpreters are available.    For health questions: Call 824-270-2048 or 1-702.531.1096 (7 a.m. to 7 p.m.) For questions about schools and childcare: Call 496-744-3635 or 1-395.807.3405 (7 a.m. to 7 p.m.)    Diagnosis: Pain in throat  Diagnosis ICD: R07.0

## 2020-07-23 ENCOUNTER — TELEPHONE (OUTPATIENT)
Dept: SLEEP MEDICINE | Facility: CLINIC | Age: 25
End: 2020-07-23

## 2020-07-23 DIAGNOSIS — Z20.822 SUSPECTED COVID-19 VIRUS INFECTION: Primary | ICD-10-CM

## 2020-07-23 PROCEDURE — U0003 INFECTIOUS AGENT DETECTION BY NUCLEIC ACID (DNA OR RNA); SEVERE ACUTE RESPIRATORY SYNDROME CORONAVIRUS 2 (SARS-COV-2) (CORONAVIRUS DISEASE [COVID-19]), AMPLIFIED PROBE TECHNIQUE, MAKING USE OF HIGH THROUGHPUT TECHNOLOGIES AS DESCRIBED BY CMS-2020-01-R: HCPCS | Performed by: FAMILY MEDICINE

## 2020-07-23 NOTE — LETTER
July 24, 2020        Nasra Galindo  [address]  [facility] [address]    COVID-19 Virus PCR to [facility] - Result   Date Value Ref Range Status   07/23/2020 Not Detected  Final     Comment:     Collection of multiple specimens from the same patient may be necessary to   detect the virus. The possibility of a false negative should be considered if   the patient's recent exposure or clinical presentation suggests 2019 nCOV   infection and diagnostic tests for other causes of illness are negative.   Repeat testing may be considered in this setting.  Viral RNA was extracted via a validated method and subsequently underwent   single step reverse transcriptase-real time polymerase chain reaction using   primers to the CDC specified N1,N2 gene targets of CoV2 and human RNP as an   internal control.  A negative result does not rule out the presence of real-time PCR inhibitors   in the specimen or COVID-19 RNA in concentrations below the limit of detection   of the assay. The possibility of a false negative should be considered if the   patients recent exposure or clinical presentation suggests COVID-19.   Additional testing or repeat testing requires consultation with the   laboratory.  Nasopharyngeal specimen is the preferred choice for swab-based SARS CoV2   testing. When collection of a nasopharyngeal swab is not possible the   following are acceptable alternatives:  an oropharyngeal (OP) specimen collected by a healthcare professional, or a   nasal mid-turbinate (NMT) swab collected by a healthcare professional or by   onsite self-collection (using a flocked tapered swab), or an anterior nares   specimen collected by a healthcare professional or by onsite self-collection   (using a round foam swab). (Centers for Disease Control)  Testing performed by [facility], Room [address]210, [address] [place] [address]. This test was developed and its   performance characteristics determined  by the [facility]. It has not been cleared or approved by the FDA.  The laboratory is regulated under the Clinical Laboratory Improvement   Amendments of 1988 (CLIA-88) as qualified to perform high-complexity testing.   This test is used for clinical purposes. It should not be regarded as   investigational or for research.         No results found for: SARSCOVRES    This letter provides a written record that you were tested for COVID-19.      Your result was negative. This means that we didn t find the virus that causes COVID-19 in your sample. A test may show negative when you do actually have the virus. This can happen when the virus is in the early stages of infection, before you feel illness symptoms.    If you have symptoms   Stay home and away from others (self-isolate) until you meet ALL of the guidelines below:    You ve had no fever--and no medicine that reduces fever--for 3 full days (72 hours). And      Your other symptoms have gotten better. For example, your cough or breathing has improved. And     At least 10 days have passed since your symptoms started.    During this time:    Stay home. Don t go to work, school or anywhere else.     Stay in your own room, including for meals. Use your own bathroom if you can.    Stay away from others in your home. No hugging, kissing or shaking hands. No visitors.    Clean  high touch  surfaces often (doorknobs, counters, handles, etc.). Use a household cleaning spray or wipes. You can find a full list on the EPA website at www.epa.gov/pesticide-registration/list-n-disinfectants-use-against-sars-cov-2.    Cover your mouth and nose with a mask, tissue or washcloth to avoid spreading germs.    Wash your hands and face often with soap and water.    Going back to work  Check with your employer for any guidelines to follow for going back to work.    Employers: This document serves as formal notice that your employee tested negative for  COVID-19, as of the testing date shown above.

## 2020-07-23 NOTE — TELEPHONE ENCOUNTER
Pt called to schedule follow up and she states she will call back at later time to schedule.    CHRISTINA Castillo

## 2020-07-24 LAB
SARS-COV-2 RNA SPEC QL NAA+PROBE: NOT DETECTED
SPECIMEN SOURCE: NORMAL

## 2020-10-13 ENCOUNTER — MYC MEDICAL ADVICE (OUTPATIENT)
Dept: FAMILY MEDICINE | Facility: CLINIC | Age: 25
End: 2020-10-13

## 2020-11-02 ENCOUNTER — OFFICE VISIT (OUTPATIENT)
Dept: FAMILY MEDICINE | Facility: CLINIC | Age: 25
End: 2020-11-02
Payer: COMMERCIAL

## 2020-11-02 VITALS
BODY MASS INDEX: 30.53 KG/M2 | SYSTOLIC BLOOD PRESSURE: 122 MMHG | HEIGHT: 66 IN | DIASTOLIC BLOOD PRESSURE: 74 MMHG | OXYGEN SATURATION: 96 % | HEART RATE: 64 BPM | WEIGHT: 190 LBS

## 2020-11-02 DIAGNOSIS — Z11.59 NEED FOR HEPATITIS C SCREENING TEST: ICD-10-CM

## 2020-11-02 DIAGNOSIS — Z13.220 LIPID SCREENING: ICD-10-CM

## 2020-11-02 DIAGNOSIS — Z23 NEED FOR PROPHYLACTIC VACCINATION AND INOCULATION AGAINST INFLUENZA: ICD-10-CM

## 2020-11-02 DIAGNOSIS — Z30.9 ENCOUNTER FOR CONTRACEPTIVE MANAGEMENT, UNSPECIFIED TYPE: ICD-10-CM

## 2020-11-02 DIAGNOSIS — Z11.3 SCREENING FOR STDS (SEXUALLY TRANSMITTED DISEASES): ICD-10-CM

## 2020-11-02 DIAGNOSIS — Z00.00 ROUTINE GENERAL MEDICAL EXAMINATION AT A HEALTH CARE FACILITY: Primary | ICD-10-CM

## 2020-11-02 DIAGNOSIS — Z13.1 SCREENING FOR DIABETES MELLITUS: ICD-10-CM

## 2020-11-02 DIAGNOSIS — Z11.4 SCREENING FOR HIV (HUMAN IMMUNODEFICIENCY VIRUS): ICD-10-CM

## 2020-11-02 DIAGNOSIS — F41.8 DEPRESSION WITH ANXIETY: ICD-10-CM

## 2020-11-02 DIAGNOSIS — M25.50 MULTIPLE JOINT PAIN: ICD-10-CM

## 2020-11-02 LAB — HGB BLD-MCNC: 14.3 G/DL (ref 11.7–15.7)

## 2020-11-02 PROCEDURE — 90471 IMMUNIZATION ADMIN: CPT | Performed by: FAMILY MEDICINE

## 2020-11-02 PROCEDURE — 99395 PREV VISIT EST AGE 18-39: CPT | Mod: 25 | Performed by: FAMILY MEDICINE

## 2020-11-02 PROCEDURE — 80061 LIPID PANEL: CPT | Performed by: FAMILY MEDICINE

## 2020-11-02 PROCEDURE — 86803 HEPATITIS C AB TEST: CPT | Performed by: FAMILY MEDICINE

## 2020-11-02 PROCEDURE — 80053 COMPREHEN METABOLIC PANEL: CPT | Performed by: FAMILY MEDICINE

## 2020-11-02 PROCEDURE — 86780 TREPONEMA PALLIDUM: CPT | Mod: 90 | Performed by: FAMILY MEDICINE

## 2020-11-02 PROCEDURE — 87389 HIV-1 AG W/HIV-1&-2 AB AG IA: CPT | Performed by: FAMILY MEDICINE

## 2020-11-02 PROCEDURE — 87591 N.GONORRHOEAE DNA AMP PROB: CPT | Performed by: FAMILY MEDICINE

## 2020-11-02 PROCEDURE — 99000 SPECIMEN HANDLING OFFICE-LAB: CPT | Performed by: FAMILY MEDICINE

## 2020-11-02 PROCEDURE — 87491 CHLMYD TRACH DNA AMP PROBE: CPT | Performed by: FAMILY MEDICINE

## 2020-11-02 PROCEDURE — 36415 COLL VENOUS BLD VENIPUNCTURE: CPT | Performed by: FAMILY MEDICINE

## 2020-11-02 PROCEDURE — 85018 HEMOGLOBIN: CPT | Performed by: FAMILY MEDICINE

## 2020-11-02 PROCEDURE — 84443 ASSAY THYROID STIM HORMONE: CPT | Performed by: FAMILY MEDICINE

## 2020-11-02 RX ORDER — DESOGESTREL AND ETHINYL ESTRADIOL 0.15-0.03
1 KIT ORAL DAILY
Status: CANCELLED | OUTPATIENT
Start: 2020-11-02

## 2020-11-02 RX ORDER — ETONOGESTREL AND ETHINYL ESTRADIOL VAGINAL RING .015; .12 MG/D; MG/D
1 RING VAGINAL
Qty: 4 EACH | Refills: 3 | Status: SHIPPED | OUTPATIENT
Start: 2020-11-02

## 2020-11-02 ASSESSMENT — ENCOUNTER SYMPTOMS
HEMATURIA: 0
PALPITATIONS: 0
SORE THROAT: 0
HEARTBURN: 0
EYE PAIN: 0
WEAKNESS: 0
CONSTIPATION: 0
ARTHRALGIAS: 1
HEADACHES: 0
JOINT SWELLING: 0
FEVER: 0
DIARRHEA: 0
DIZZINESS: 0
PARESTHESIAS: 0
NERVOUS/ANXIOUS: 1
BREAST MASS: 0
CHILLS: 0
ABDOMINAL PAIN: 0
NAUSEA: 0
HEMATOCHEZIA: 0
FREQUENCY: 0
COUGH: 0
SHORTNESS OF BREATH: 0
MYALGIAS: 0
DYSURIA: 0

## 2020-11-02 ASSESSMENT — PATIENT HEALTH QUESTIONNAIRE - PHQ9
10. IF YOU CHECKED OFF ANY PROBLEMS, HOW DIFFICULT HAVE THESE PROBLEMS MADE IT FOR YOU TO DO YOUR WORK, TAKE CARE OF THINGS AT HOME, OR GET ALONG WITH OTHER PEOPLE: SOMEWHAT DIFFICULT
SUM OF ALL RESPONSES TO PHQ QUESTIONS 1-9: 8
SUM OF ALL RESPONSES TO PHQ QUESTIONS 1-9: 8

## 2020-11-02 ASSESSMENT — MIFFLIN-ST. JEOR: SCORE: 1633.58

## 2020-11-02 NOTE — PROGRESS NOTES
SUBJECTIVE:   CC: Nasra Galindo is an 24 year old woman who presents for preventive health visit.       Patient has been advised of split billing requirements and indicates understanding: Yes  Healthy Habits:     Getting at least 3 servings of Calcium per day:  NO    Bi-annual eye exam:  NO    Dental care twice a year:  NO    Sleep apnea or symptoms of sleep apnea:  Daytime drowsiness    Diet:  Carbohydrate counting and Breakfast skipped    Frequency of exercise:  2-3 days/week    Duration of exercise:  45-60 minutes    Taking medications regularly:  Yes    Medication side effects:  Not applicable    PHQ-2 Total Score: 4    Additional concerns today:  Yes  Contraception  Associated symptoms include arthralgias. Pertinent negatives include no abdominal pain, chest pain, chills, congestion, coughing, fever, headaches, joint swelling, myalgias, nausea, rash, sore throat or weakness.       She is not on any contraception  Not currently   Has not been on on anything else except the pill    Periods are regular    Lost weight working out, diet modification  Low carb diet    Some joint pains, SI joint and knee pain, she has done physical therapy and is seeing improvement, noticing more     Depression and anxiey-sees a therapist for years but now she is switching clinic and is stable, does nnot want meds  She is stable    Today's PHQ-2 Score:   PHQ-2 ( 1999 Pfizer) 11/2/2020   Q1: Little interest or pleasure in doing things 2   Q2: Feeling down, depressed or hopeless 2   PHQ-2 Score 4   Q1: Little interest or pleasure in doing things More than half the days   Q2: Feeling down, depressed or hopeless More than half the days   PHQ-2 Score 4       Abuse: Current or Past (Physical, Sexual or Emotional) - No  Do you feel safe in your environment? Yes         Social History     Tobacco Use     Smoking status: Never Smoker     Smokeless tobacco: Never Used   Substance Use Topics     Alcohol use: Yes     Alcohol/week: 0.0 standard  drinks     Comment: Use is occasional and social         Alcohol Use 11/2/2020   Prescreen: >3 drinks/day or >7 drinks/week? No   Prescreen: >3 drinks/day or >7 drinks/week? -       Reviewed orders with patient.  Reviewed health maintenance and updated orders accordingly - Yes  Labs reviewed in EPIC  BP Readings from Last 3 Encounters:   11/02/20 122/74   02/10/20 113/77   12/17/19 127/82    Wt Readings from Last 3 Encounters:   11/02/20 86.2 kg (190 lb)   02/10/20 92.5 kg (204 lb)   01/09/20 95.3 kg (210 lb)                    Mammogram not appropriate for this patient based on age.    Pertinent mammograms are reviewed under the imaging tab.  History of abnormal Pap smear: NO - age 21-29 PAP every 3 years recommended  PAP / HPV 10/30/2019   PAP NIL     Reviewed and updated as needed this visit by clinical staff  Tobacco  Allergies  Meds              Reviewed and updated as needed this visit by Provider                Past Medical History:   Diagnosis Date     Anxiety      Depressive disorder Diagnosed may 2019    Has been recurrent since 2010      Past Surgical History:   Procedure Laterality Date     BIOPSY  July 2016     OB History   No obstetric history on file.       Review of Systems   Constitutional: Negative for chills and fever.   HENT: Negative for congestion, ear pain, hearing loss and sore throat.    Eyes: Negative for pain and visual disturbance.   Respiratory: Negative for cough and shortness of breath.    Cardiovascular: Negative for chest pain, palpitations and peripheral edema.   Gastrointestinal: Negative for abdominal pain, constipation, diarrhea, heartburn, hematochezia and nausea.   Breasts:  Negative for tenderness, breast mass and discharge.   Genitourinary: Negative for dysuria, frequency, genital sores, hematuria, pelvic pain, urgency, vaginal bleeding and vaginal discharge.   Musculoskeletal: Positive for arthralgias. Negative for joint swelling and myalgias.   Skin: Negative for rash.  "  Neurological: Negative for dizziness, weakness, headaches and paresthesias.   Psychiatric/Behavioral: Negative for mood changes. The patient is nervous/anxious.      CONSTITUTIONAL: NEGATIVE for fever, chills, change in weight  INTEGUMENTARU/SKIN: NEGATIVE for worrisome rashes, moles or lesions  EYES: NEGATIVE for vision changes or irritation  ENT: NEGATIVE for ear, mouth and throat problems  RESP: NEGATIVE for significant cough or SOB  BREAST: NEGATIVE for masses, tenderness or discharge  CV: NEGATIVE for chest pain, palpitations or peripheral edema  GI: NEGATIVE for nausea, abdominal pain, heartburn, or change in bowel habits  : NEGATIVE for unusual urinary or vaginal symptoms. Periods are regular.  MUSCULOSKELETAL: NEGATIVE for significant arthralgias or myalgia  NEURO: NEGATIVE for weakness, dizziness or paresthesias  PSYCHIATRIC: NEGATIVE for changes in mood or affect     OBJECTIVE:   /74   Pulse 64   Ht 1.676 m (5' 6\")   Wt 86.2 kg (190 lb)   SpO2 96%   BMI 30.67 kg/m    Physical Exam  GENERAL: healthy, alert and no distress  EYES: Eyes grossly normal to inspection, PERRL and conjunctivae and sclerae normal  HENT: ear canals and TM's normal, nose and mouth without ulcers or lesions  NECK: no adenopathy, no asymmetry, masses, or scars and thyroid normal to palpation  RESP: lungs clear to auscultation - no rales, rhonchi or wheezes  BREAST: normal without masses, tenderness or nipple discharge and no palpable axillary masses or adenopathy  CV: regular rate and rhythm, normal S1 S2, no S3 or S4, no murmur, click or rub, no peripheral edema and peripheral pulses strong  ABDOMEN: soft, nontender, no hepatosplenomegaly, no masses and bowel sounds normal   (female): normal female external genitalia, normal urethral meatus, vaginal mucosa pink, moist, well rugated, and normal cervix/adnexa/uterus without masses or discharge  MS: no gross musculoskeletal defects noted, no edema  SKIN: no suspicious " lesions or rashes  NEURO: Normal strength and tone, mentation intact and speech normal  PSYCH: mentation appears normal, affect normal/bright    Diagnostic Test Results:  Labs reviewed in Epic    ASSESSMENT/PLAN:       ICD-10-CM    1. Routine general medical examination at a health care facility  Z00.00 Hemoglobin   2. Depression with anxiety  F41.8 OFFICE/OUTPT VISIT,EST,LEVL II   3. Encounter for contraceptive management, unspecified type  Z30.9 etonogestrel-ethinyl estradiol (NUVARING) 0.12-0.015 MG/24HR vaginal ring   4. Screening for STDs (sexually transmitted diseases)  Z11.3 HIV Antigen Antibody Combo     NEISSERIA GONORRHOEA PCR     CHLAMYDIA TRACHOMATIS PCR     Treponema Abs w Reflex to RPR and Titer   5. Need for prophylactic vaccination and inoculation against influenza  Z23 INFLUENZA VACCINE IM > 6 MONTHS VALENT IIV4 [29016]   6. Screening for diabetes mellitus  Z13.1    7. Lipid screening  Z13.220    8. BMI 30.0-30.9,adult  Z68.30 Comprehensive metabolic panel     TSH with free T4 reflex     Lipid panel reflex to direct LDL Fasting     OFFICE/OUTPT VISIT,EST,LEVL II   9. Screening for HIV (human immunodeficiency virus)  Z11.4    10. Need for hepatitis C screening test  Z11.59 Hepatitis C Screen Reflex to HCV RNA Quant and Genotype   11. Multiple joint pain  M25.50 OFFICE/OUTPT VISIT,EST,LEVL II     Depression with anxiety-never been on meds, she has been going to therapy now losing her insurance so may not be able to get in soon.  She is handling a lot of stressors-social changes, covid etc  Joint pains-knees, improving with weight loss-close monitoring  BMI 30-made lots of lifestyle changes and has lost weight, continue current regiment, screening labs done including cmp, lipids, tsh  Screening std  Contraception, reviewed all options, she wants to try nuva ring, risks and benefits reviewed    Shots uptodate    Patient has been advised of split billing requirements and indicates understanding:  "Yes  COUNSELING:  Reviewed preventive health counseling, as reflected in patient instructions    Estimated body mass index is 30.67 kg/m  as calculated from the following:    Height as of this encounter: 1.676 m (5' 6\").    Weight as of this encounter: 86.2 kg (190 lb).    Weight management plan: Discussed healthy diet and exercise guidelines    She reports that she has never smoked. She has never used smokeless tobacco.      Counseling Resources:  ATP IV Guidelines  Pooled Cohorts Equation Calculator  Breast Cancer Risk Calculator  BRCA-Related Cancer Risk Assessment: FHS-7 Tool  FRAX Risk Assessment  ICSI Preventive Guidelines  Dietary Guidelines for Americans, 2010  USDA's MyPlate  ASA Prophylaxis  Lung CA Screening    DO SAD[facility]  Answers for HPI/ROS submitted by the patient on 11/2/2020   Annual Exam:  If you checked off any problems, how difficult have these problems made it for you to do your work, take care of things at home, or get along with other people?: Somewhat difficult  PHQ9 TOTAL SCORE: 8    "

## 2020-11-02 NOTE — LETTER
"[facility]  [address] [facility] [address] [phone]                                                                                                November 9, 2020    Nasra Galindo  [address] [place], [place] [address] [facility] [address]        Dear Ms. Galindo,    Your cholesterol is abnormal, please use the recommendations below and recheck labs in 6-12 months.     Ways to improve your cholesterol...     1- Eats less saturated fats (including avoiding \"trans\" fats).     2 - Eat more unsaturated fats  - found in vegetables, grains, and tree nuts.   Also by replacing butter with canola oil or olive oil.     3 - Eat more nuts. 1-2 ounces (a small handful) of almonds, walnuts, hazelnuts or pecans once a day in place of other less healthy snacks.     4 - Eat more high fiber foods - vegetables and whole grains including oat bran, oats, beans, peas, and flax seed.     5 - Eat more fish - such as salmon, tuna, mackerel, and sardines.  1 or 2 six ounce servings per week is a healthy replacement for other proteins.     6 - Exercise for at least 120 minutes per week - which is equal to 30 minutes 4 days per week.       Sincerely,      Mary Ramesh DO/hl    Results for orders placed or performed in visit on 11/02/20   HIV Antigen Antibody Combo     Status: None   Result Value Ref Range    HIV Antigen Antibody Combo Nonreactive NR^Nonreactive       Hepatitis C Screen Reflex to HCV RNA Quant and Genotype     Status: None   Result Value Ref Range    Hepatitis C Antibody Nonreactive NR^Nonreactive   Comprehensive metabolic panel     Status: None   Result Value Ref Range    Sodium 138 133 - 144 mmol/L    Potassium 3.5 3.4 - 5.3 mmol/L    Chloride 106 94 - 109 mmol/L    Carbon Dioxide 28 20 - 32 mmol/L    Anion Gap 4 3 - 14 mmol/L    Glucose 90 70 - 99 mg/dL    Urea Nitrogen 13 7 - 30 mg/dL    Creatinine 0.90 0.52 - 1.04 mg/dL    GFR Estimate 89 >60 mL/min/[1.73_m2]    GFR Estimate If Black >90 " >60 mL/min/[1.73_m2]    Calcium 8.9 8.5 - 10.1 mg/dL    Bilirubin Total 0.9 0.2 - 1.3 mg/dL    Albumin 3.6 3.4 - 5.0 g/dL    Protein Total 7.4 6.8 - 8.8 g/dL    Alkaline Phosphatase 60 40 - 150 U/L    ALT 14 0 - 50 U/L    AST 13 0 - 45 U/L   TSH with free T4 reflex     Status: None   Result Value Ref Range    TSH 0.71 0.40 - 4.00 mU/L   Lipid panel reflex to direct LDL Fasting     Status: Abnormal   Result Value Ref Range    Cholesterol 233 (H) <200 mg/dL    Triglycerides 166 (H) <150 mg/dL    HDL Cholesterol 42 (L) >49 mg/dL    LDL Cholesterol Calculated 158 (H) <100 mg/dL    Non HDL Cholesterol 191 (H) <130 mg/dL   Hemoglobin     Status: None   Result Value Ref Range    Hemoglobin 14.3 11.7 - 15.7 g/dL   Treponema Abs w Reflex to RPR and Titer     Status: None   Result Value Ref Range    Treponema Antibodies Nonreactive NR^Nonreactive   NEISSERIA GONORRHOEA PCR     Status: None    Specimen: Vagina   Result Value Ref Range    Specimen Descrip Vagina     N Gonorrhea PCR Negative NEG^Negative   CHLAMYDIA TRACHOMATIS PCR     Status: None    Specimen: Vagina   Result Value Ref Range    Specimen Description Vagina     Chlamydia Trachomatis PCR Negative NEG^Negative

## 2020-11-03 LAB
ALBUMIN SERPL-MCNC: 3.6 G/DL (ref 3.4–5)
ALP SERPL-CCNC: 60 U/L (ref 40–150)
ALT SERPL W P-5'-P-CCNC: 14 U/L (ref 0–50)
ANION GAP SERPL CALCULATED.3IONS-SCNC: 4 MMOL/L (ref 3–14)
AST SERPL W P-5'-P-CCNC: 13 U/L (ref 0–45)
BILIRUB SERPL-MCNC: 0.9 MG/DL (ref 0.2–1.3)
BUN SERPL-MCNC: 13 MG/DL (ref 7–30)
CALCIUM SERPL-MCNC: 8.9 MG/DL (ref 8.5–10.1)
CHLORIDE SERPL-SCNC: 106 MMOL/L (ref 94–109)
CHOLEST SERPL-MCNC: 233 MG/DL
CO2 SERPL-SCNC: 28 MMOL/L (ref 20–32)
CREAT SERPL-MCNC: 0.9 MG/DL (ref 0.52–1.04)
GFR SERPL CREATININE-BSD FRML MDRD: 89 ML/MIN/{1.73_M2}
GLUCOSE SERPL-MCNC: 90 MG/DL (ref 70–99)
HCV AB SERPL QL IA: NONREACTIVE
HDLC SERPL-MCNC: 42 MG/DL
HIV 1+2 AB+HIV1 P24 AG SERPL QL IA: NONREACTIVE
LDLC SERPL CALC-MCNC: 158 MG/DL
NONHDLC SERPL-MCNC: 191 MG/DL
POTASSIUM SERPL-SCNC: 3.5 MMOL/L (ref 3.4–5.3)
PROT SERPL-MCNC: 7.4 G/DL (ref 6.8–8.8)
SODIUM SERPL-SCNC: 138 MMOL/L (ref 133–144)
T PALLIDUM AB SER QL: NONREACTIVE
TRIGL SERPL-MCNC: 166 MG/DL
TSH SERPL DL<=0.005 MIU/L-ACNC: 0.71 MU/L (ref 0.4–4)

## 2020-11-03 PROCEDURE — 90686 IIV4 VACC NO PRSV 0.5 ML IM: CPT | Performed by: FAMILY MEDICINE

## 2020-11-03 ASSESSMENT — PATIENT HEALTH QUESTIONNAIRE - PHQ9: SUM OF ALL RESPONSES TO PHQ QUESTIONS 1-9: 8

## 2020-11-06 NOTE — RESULT ENCOUNTER NOTE
"Your cholesterol is abnormal, please use the recommendations below and recheck labs in 6-12 months.    Ways to improve your cholesterol...    1- Eats less saturated fats (including avoiding \"trans\" fats).    2 - Eat more unsaturated fats  - found in vege  tables, grains, and tree nuts.   Also by replacing butter with canola oil or olive oil.    3 - Eat more nuts.   1-2 ounces (a small handful) of almonds, walnuts, hazelnuts or pecans once a  day in place of other less healthy snacks.    4 - Eat more high   fiber foods - vegetables and whole grains including oat bran, oats, beans, peas, and flax seed.    5 - Eat more fish - such as salmon, tuna, mackerel, and sardines.  1 or 2 six ounce servings per week is a healthy replacement for other proteins.    6 - E  xercise for at least 120 minutes per week - which is equal to 30 minutes 4 days per week.    Mary Ramesh D.O.    "

## 2021-08-14 ENCOUNTER — TELEPHONE (OUTPATIENT)
Dept: NURSING | Facility: CLINIC | Age: 26
End: 2021-08-14

## 2021-08-14 NOTE — TELEPHONE ENCOUNTER
Patient calling regarding her nuvaring prescription, requesting remaining refill be called into her preferred pharmacy. Refill sent to preferred pharmacy. Nothing further needed at this time.     Sulma Haynes RN 08/14/21 2:11 PM   [facility] Triage Nurse Advisor

## 2021-10-09 ENCOUNTER — HEALTH MAINTENANCE LETTER (OUTPATIENT)
Age: 26
End: 2021-10-09

## 2021-12-04 ENCOUNTER — HEALTH MAINTENANCE LETTER (OUTPATIENT)
Age: 26
End: 2021-12-04

## 2022-09-11 ENCOUNTER — HEALTH MAINTENANCE LETTER (OUTPATIENT)
Age: 27
End: 2022-09-11

## 2022-09-12 ENCOUNTER — TELEPHONE (OUTPATIENT)
Dept: SLEEP MEDICINE | Facility: CLINIC | Age: 27
End: 2022-09-12

## 2022-09-12 NOTE — TELEPHONE ENCOUNTER
Reason for call:  Other   Patient called regarding (reason for call): call back  Additional comments: Patient is requesting records of the sleep study report also follow by the follow up visit with the Diagnosis sleep study dates 1/27/2020 follow up visit date 2/10. Would to records sent into [facility]. Would like call back     Phone number to reach patient:  Cell number on file:    Telephone Information:   Mobile [phone]       Best Time:  Anytime    Can we leave a detailed message on this number?  YES    Travel screening: Not Applicable

## 2022-09-12 NOTE — TELEPHONE ENCOUNTER
Returned patients call. Patient states that she needs a copy of her sleep study or documentation indicating Sleep Phase Delay.     Documentation noted in sleep study. Copy of sleep study report sent to patient via [facility] as requested.     Sandra Kwan RN on 9/12/2022 at 10:03 AM

## 2023-01-23 ENCOUNTER — HEALTH MAINTENANCE LETTER (OUTPATIENT)
Age: 28
End: 2023-01-23

## 2024-02-24 ENCOUNTER — HEALTH MAINTENANCE LETTER (OUTPATIENT)
Age: 29
End: 2024-02-24